# Patient Record
Sex: FEMALE | Race: WHITE | NOT HISPANIC OR LATINO | Employment: UNEMPLOYED | URBAN - NONMETROPOLITAN AREA
[De-identification: names, ages, dates, MRNs, and addresses within clinical notes are randomized per-mention and may not be internally consistent; named-entity substitution may affect disease eponyms.]

---

## 2020-08-07 ENCOUNTER — HOSPITAL ENCOUNTER (INPATIENT)
Facility: HOSPITAL | Age: 85
LOS: 7 days | Discharge: HOME | End: 2020-08-14
Attending: PSYCHIATRY & NEUROLOGY | Admitting: PSYCHIATRY & NEUROLOGY

## 2020-08-07 PROBLEM — F29 PSYCHOSIS (HCC): Status: ACTIVE | Noted: 2020-08-07

## 2020-08-07 LAB — MAGNESIUM SERPL-MCNC: 1.9 MG/DL (ref 1.6–2.4)

## 2020-08-07 PROCEDURE — 99223 1ST HOSP IP/OBS HIGH 75: CPT | Performed by: PSYCHIATRY & NEUROLOGY

## 2020-08-07 PROCEDURE — 25010000002 LORAZEPAM PER 2 MG: Performed by: PSYCHIATRY & NEUROLOGY

## 2020-08-07 PROCEDURE — 83735 ASSAY OF MAGNESIUM: CPT

## 2020-08-07 RX ORDER — FLUCONAZOLE 100 MG/1
100 TABLET ORAL DAILY
Status: DISPENSED | OUTPATIENT
Start: 2020-08-07 | End: 2020-08-12

## 2020-08-07 RX ORDER — ASPIRIN 81 MG/1
81 TABLET, CHEWABLE ORAL DAILY
Status: DISCONTINUED | OUTPATIENT
Start: 2020-08-07 | End: 2020-08-14 | Stop reason: HOSPADM

## 2020-08-07 RX ORDER — LOSARTAN POTASSIUM 50 MG/1
100 TABLET ORAL DAILY
Status: DISCONTINUED | OUTPATIENT
Start: 2020-08-07 | End: 2020-08-14 | Stop reason: HOSPADM

## 2020-08-07 RX ORDER — ASPIRIN 81 MG/1
81 TABLET, CHEWABLE ORAL DAILY
COMMUNITY

## 2020-08-07 RX ORDER — POTASSIUM CHLORIDE 20 MEQ/1
20 TABLET, EXTENDED RELEASE ORAL DAILY
Status: DISCONTINUED | OUTPATIENT
Start: 2020-08-07 | End: 2020-08-14 | Stop reason: HOSPADM

## 2020-08-07 RX ORDER — OLANZAPINE 5 MG/1
5 TABLET, ORALLY DISINTEGRATING ORAL ONCE
Status: COMPLETED | OUTPATIENT
Start: 2020-08-07 | End: 2020-08-07

## 2020-08-07 RX ORDER — ESOMEPRAZOLE MAGNESIUM 40 MG/1
40 CAPSULE, DELAYED RELEASE ORAL
COMMUNITY

## 2020-08-07 RX ORDER — PANTOPRAZOLE SODIUM 40 MG/1
40 TABLET, DELAYED RELEASE ORAL EVERY MORNING
Status: DISCONTINUED | OUTPATIENT
Start: 2020-08-07 | End: 2020-08-14 | Stop reason: HOSPADM

## 2020-08-07 RX ORDER — TRAZODONE HYDROCHLORIDE 50 MG/1
12.5 TABLET ORAL NIGHTLY PRN
Status: DISPENSED | OUTPATIENT
Start: 2020-08-07 | End: 2020-08-09

## 2020-08-07 RX ORDER — OLANZAPINE 5 MG/1
5 TABLET, ORALLY DISINTEGRATING ORAL DAILY
Status: DISCONTINUED | OUTPATIENT
Start: 2020-08-08 | End: 2020-08-08

## 2020-08-07 RX ORDER — ONDANSETRON 4 MG/1
4 TABLET, FILM COATED ORAL EVERY MORNING
Status: DISCONTINUED | OUTPATIENT
Start: 2020-08-07 | End: 2020-08-14 | Stop reason: HOSPADM

## 2020-08-07 RX ORDER — POTASSIUM CHLORIDE 20 MEQ/1
20 TABLET, EXTENDED RELEASE ORAL DAILY
COMMUNITY

## 2020-08-07 RX ORDER — LORAZEPAM 2 MG/ML
1 INJECTION INTRAMUSCULAR ONCE
Status: COMPLETED | OUTPATIENT
Start: 2020-08-07 | End: 2020-08-07

## 2020-08-07 RX ORDER — ONDANSETRON 4 MG/1
4 TABLET, FILM COATED ORAL EVERY MORNING
Status: CANCELLED | OUTPATIENT
Start: 2020-08-07

## 2020-08-07 RX ORDER — MULTIVITAMIN
1 TABLET ORAL DAILY
Status: CANCELLED | OUTPATIENT
Start: 2020-08-07

## 2020-08-07 RX ORDER — MULTIVITAMIN
1 TABLET ORAL DAILY
Status: DISCONTINUED | OUTPATIENT
Start: 2020-08-07 | End: 2020-08-14 | Stop reason: HOSPADM

## 2020-08-07 RX ORDER — LOSARTAN POTASSIUM 50 MG/1
100 TABLET ORAL DAILY
Status: CANCELLED | OUTPATIENT
Start: 2020-08-07

## 2020-08-07 RX ORDER — POTASSIUM CHLORIDE 20 MEQ/1
20 TABLET, EXTENDED RELEASE ORAL DAILY
Status: CANCELLED | OUTPATIENT
Start: 2020-08-07

## 2020-08-07 RX ORDER — CARVEDILOL 25 MG/1
25 TABLET ORAL 2 TIMES DAILY WITH MEALS
Status: DISCONTINUED | OUTPATIENT
Start: 2020-08-07 | End: 2020-08-14 | Stop reason: HOSPADM

## 2020-08-07 RX ORDER — BENZONATATE 100 MG/1
100 CAPSULE ORAL 3 TIMES DAILY PRN
Status: DISCONTINUED | OUTPATIENT
Start: 2020-08-07 | End: 2020-08-14 | Stop reason: HOSPADM

## 2020-08-07 RX ORDER — AMLODIPINE BESYLATE 5 MG/1
5 TABLET ORAL DAILY
Status: CANCELLED | OUTPATIENT
Start: 2020-08-07

## 2020-08-07 RX ORDER — POLYETHYLENE GLYCOL 3350 17 G/17G
17 POWDER, FOR SOLUTION ORAL DAILY
Status: CANCELLED | OUTPATIENT
Start: 2020-08-07

## 2020-08-07 RX ORDER — FLUCONAZOLE 50 MG/1
100 TABLET ORAL DAILY
COMMUNITY
Start: 2020-08-05 | End: 2020-08-14 | Stop reason: HOSPADM

## 2020-08-07 RX ORDER — ASPIRIN 81 MG/1
81 TABLET, CHEWABLE ORAL DAILY
Status: CANCELLED | OUTPATIENT
Start: 2020-08-07

## 2020-08-07 RX ORDER — POLYETHYLENE GLYCOL 3350 17 G/17G
17 POWDER, FOR SOLUTION ORAL DAILY
COMMUNITY

## 2020-08-07 RX ORDER — AMLODIPINE BESYLATE 5 MG/1
5 TABLET ORAL DAILY
COMMUNITY

## 2020-08-07 RX ORDER — MULTIVITAMIN
1 TABLET ORAL DAILY
COMMUNITY

## 2020-08-07 RX ORDER — IBUPROFEN 400 MG/1
400 TABLET ORAL EVERY 6 HOURS PRN
Status: DISCONTINUED | OUTPATIENT
Start: 2020-08-07 | End: 2020-08-14 | Stop reason: HOSPADM

## 2020-08-07 RX ORDER — ONDANSETRON 4 MG/1
4 TABLET, FILM COATED ORAL EVERY MORNING
COMMUNITY

## 2020-08-07 RX ORDER — CEPHALEXIN 500 MG/1
500 CAPSULE ORAL 2 TIMES DAILY
COMMUNITY
Start: 2020-08-05 | End: 2020-08-14 | Stop reason: HOSPADM

## 2020-08-07 RX ORDER — ECHINACEA PURPUREA EXTRACT 125 MG
2 TABLET ORAL AS NEEDED
Status: DISCONTINUED | OUTPATIENT
Start: 2020-08-07 | End: 2020-08-14 | Stop reason: HOSPADM

## 2020-08-07 RX ORDER — LOPERAMIDE HYDROCHLORIDE 2 MG/1
2 CAPSULE ORAL
Status: DISCONTINUED | OUTPATIENT
Start: 2020-08-07 | End: 2020-08-14 | Stop reason: HOSPADM

## 2020-08-07 RX ORDER — CARVEDILOL 25 MG/1
25 TABLET ORAL 2 TIMES DAILY WITH MEALS
Status: CANCELLED | OUTPATIENT
Start: 2020-08-07

## 2020-08-07 RX ORDER — CEPHALEXIN 500 MG/1
500 CAPSULE ORAL EVERY 12 HOURS SCHEDULED
Status: DISCONTINUED | OUTPATIENT
Start: 2020-08-07 | End: 2020-08-14 | Stop reason: HOSPADM

## 2020-08-07 RX ORDER — POLYETHYLENE GLYCOL 3350 17 G/17G
17 POWDER, FOR SOLUTION ORAL DAILY
Status: DISCONTINUED | OUTPATIENT
Start: 2020-08-07 | End: 2020-08-14 | Stop reason: HOSPADM

## 2020-08-07 RX ORDER — ACETAMINOPHEN 325 MG/1
650 TABLET ORAL EVERY 6 HOURS PRN
Status: DISCONTINUED | OUTPATIENT
Start: 2020-08-07 | End: 2020-08-14 | Stop reason: HOSPADM

## 2020-08-07 RX ORDER — PANTOPRAZOLE SODIUM 40 MG/1
40 TABLET, DELAYED RELEASE ORAL EVERY MORNING
Status: CANCELLED | OUTPATIENT
Start: 2020-08-07

## 2020-08-07 RX ORDER — ONDANSETRON 4 MG/1
4 TABLET, FILM COATED ORAL EVERY 6 HOURS PRN
Status: DISCONTINUED | OUTPATIENT
Start: 2020-08-07 | End: 2020-08-14 | Stop reason: HOSPADM

## 2020-08-07 RX ORDER — LANOLIN ALCOHOL/MO/W.PET/CERES
3 CREAM (GRAM) TOPICAL NIGHTLY PRN
Status: DISCONTINUED | OUTPATIENT
Start: 2020-08-09 | End: 2020-08-14 | Stop reason: HOSPADM

## 2020-08-07 RX ORDER — LOSARTAN POTASSIUM 100 MG/1
100 TABLET ORAL DAILY
COMMUNITY

## 2020-08-07 RX ORDER — AMLODIPINE BESYLATE 5 MG/1
5 TABLET ORAL DAILY
Status: DISCONTINUED | OUTPATIENT
Start: 2020-08-07 | End: 2020-08-14 | Stop reason: HOSPADM

## 2020-08-07 RX ORDER — ALUMINA, MAGNESIA, AND SIMETHICONE 2400; 2400; 240 MG/30ML; MG/30ML; MG/30ML
15 SUSPENSION ORAL EVERY 6 HOURS PRN
Status: DISCONTINUED | OUTPATIENT
Start: 2020-08-07 | End: 2020-08-14 | Stop reason: HOSPADM

## 2020-08-07 RX ORDER — CARVEDILOL 25 MG/1
25 TABLET ORAL 2 TIMES DAILY WITH MEALS
COMMUNITY

## 2020-08-07 RX ADMIN — TRAZODONE HYDROCHLORIDE 12.5 MG: 50 TABLET ORAL at 21:31

## 2020-08-07 RX ADMIN — OLANZAPINE 5 MG: 5 TABLET, ORALLY DISINTEGRATING ORAL at 17:34

## 2020-08-07 RX ADMIN — MAGNESIUM GLUCONATE 500 MG ORAL TABLET 400 MG: 500 TABLET ORAL at 21:31

## 2020-08-07 RX ADMIN — LORAZEPAM 1 MG: 2 INJECTION, SOLUTION INTRAMUSCULAR; INTRAVENOUS at 14:45

## 2020-08-07 RX ADMIN — CEPHALEXIN 500 MG: 500 CAPSULE ORAL at 21:31

## 2020-08-07 NOTE — PLAN OF CARE
Problem: Patient Care Overview  Goal: Plan of Care Review  Outcome: Ongoing (interventions implemented as appropriate)  Flowsheets (Taken 8/7/2020 1523)  Progress: no change  Plan of Care Reviewed With: patient  Patient Agreement with Plan of Care: unable to participate  Outcome Summary: Patient oriented to self only. Manic, hyperverbal, agitated and hostile majority of shift requiring frequent redirectionand PRN medications. Sitter at bedside due to falls and confusion with poor boundaries.

## 2020-08-07 NOTE — H&P
INITIAL PSYCHIATRIC HISTORY & PHYSICAL    Patient Identification:  Name:  Vandana Velez  Age:  85 y.o.  Sex:  female  :  1935  MRN:  [id number]   Visit Number:  [id number]  Primary Care Physician:  Vasu Gunn MD    SUBJECTIVE    CC/Focus of Exam: psychosis    HPI: Vandana Velez is a 85 y.o. female who was admitted on 2020 with complaints of bizarre behaviors. The patient was seen in her room where she is very irritable and unhappy and has been screaming on an ongoing basis. She is confused and is not able to provide a coherent history and tends to confabulate also, she believes she is in [place] and tried to shana the question about time saying it is present time, and she wants to see her own doctor because he knows everything about her and she believes he is around as he also works in [facility].   According to report provided by patient's son, up until July 10, 2020, the patient was completely independent and coherent in ADLs and IADLs. She was able to drive on her own, go to grocery store and also lived by herself. Son reported to nursing staff earlier today that the patient under went botox injection for her bladder on 7/10 and she has had these injections on an off for the last seven years, but since her last injection she has not done well. Her condition has declined and she has had spells of weakness with confusion and incoherence and could no longer taker care of herself. She was admitted twice to the hospital for low sodium since 7/10 and was discharged last time to NH for rehab. She became agitated at the NH and was admitted to the psych unit and came back with no improvement. She was treated with Seroquel but it didn't help.     PAST PSYCHIATRIC HX: None reported    SUBSTANCE USE HX: None reported    SOCIAL HX:   Social History     Socioeconomic History   • Marital status:      Spouse name: Not on file   • Number of children: Not on file   • Years of  education: Not on file   • Highest education level: Not on file   Tobacco Use   • Smoking status: Unknown If Ever Smoked   Substance and Sexual Activity   • Alcohol use: Not Currently   • Drug use: Never   • Sexual activity: Not Currently         Past Medical History:   Diagnosis Date   • Hypertension    • Overactive bladder           Past Surgical History:   Procedure Laterality Date   • CYSTOSCOPY BOTOX INJECTION OF BLADDER         Family History:  The patient is unable to provided a valid family history due to confusion.      Medications Prior to Admission   Medication Sig Dispense Refill Last Dose   • amLODIPine (NORVASC) 5 MG tablet Take 5 mg by mouth Daily.   8/6/2020 at Unknown time   • aspirin 81 MG chewable tablet Chew 81 mg Daily.   8/6/2020 at Unknown time   • carvedilol (COREG) 25 MG tablet Take 25 mg by mouth 2 (Two) Times a Day With Meals.   8/6/2020 at Unknown time   • cephalexin (KEFLEX) 500 MG capsule Take 500 mg by mouth 2 (Two) Times a Day.   8/6/2020 at Unknown time   • esomeprazole (nexIUM) 40 MG capsule Take 40 mg by mouth Every Morning Before Breakfast.   8/6/2020 at Unknown time   • fluconazole (DIFLUCAN) 50 MG tablet Take 100 mg by mouth Daily.   8/6/2020 at Unknown time   • losartan (COZAAR) 100 MG tablet Take 100 mg by mouth Daily.   8/6/2020 at Unknown time   • magnesium oxide (MAGOX) 400 (241.3 Mg) MG tablet tablet Take 400 mg by mouth 2 (two) times a day.   8/6/2020 at Unknown time   • multivitamin (DAILY ROSETTE) tablet tablet Take 1 tablet by mouth Daily.   8/6/2020 at Unknown time   • ondansetron (ZOFRAN) 4 MG tablet Take 4 mg by mouth Every Morning.   8/6/2020 at Unknown time   • polyethylene glycol (MIRALAX) packet Take 17 g by mouth Daily.   8/6/2020 at Unknown time   • potassium chloride (K-DUR,KLOR-CON) 20 MEQ CR tablet Take 20 mEq by mouth Daily.   8/6/2020 at Unknown time         ALLERGIES:  Patient has no known allergies.    Temp:  [96.9 °F (36.1 °C)-98.2 °F (36.8 °C)] 98.2 °F  (36.8 °C)  Heart Rate:  [56-82] 56  Resp:  [16-18] 16  BP: (124-139)/(60-83) 139/83    REVIEW OF SYSTEMS:  Review of Systems   The validity of ROS is questionable but she reports she is hurting all over.    OBJECTIVE    PHYSICAL EXAM:  Physical Exam  The patient was not cooperative.    MENTAL STATUS EXAM:    Hygiene:   poor  Cooperation:  Suspicious  Eye Contact:  Poor  Psychomotor Behavior:  Restless  Affect:  Labile  Hopelessness: Denies  Speech:  Rambling  Thought Progress:  Disorganized  Thought Content:  Bizarre  Suicidal:  None  Homicidal:  None  Hallucinations:  None  Delusion:  Paranoid  Memory:  Deficits  Orientation:  Person  Reliability:  poor  Insight:  Poor  Judgement:  Poor  Impulse Control:  Poor      Imaging Results (Last 24 Hours)     ** No results found for the last 24 hours. **           ECG/EMG Results (most recent)     None           Lab Results   Component Value Date    BUN 17 12/23/2018    CREATININE 0.88 12/23/2018    EGFRIFNONA >60 12/23/2018    EGFRIFAFRI >60 12/23/2018    BCR 19 12/23/2018    CO2 27 12/23/2018    CALCIUM 9.0 12/23/2018       Lab Results   Component Value Date    WBC 6.7 08/03/2020    HGB 13.1 08/03/2020    HCT 37.6 08/03/2020    MCV 92.9 08/03/2020     08/03/2020       Last Urine Toxicity     There is no flowsheet data to display.          Brief Urine Lab Results     None          DATA  Labs reviewed. WBC 6.5, Glucose 99, electrolytes faustina, BUN high at 39, Creatinine normal at 1.0, Sodium normal at 140, Potassium low at 3.4, GFR low at 52.9, Blood alcohol level 0.0, UDS negative.COV2 negativeof head shows cortical volume loss and scattered periventricular/subcortical small vessel ischemic disease. Intravascular calcifications are present.  EKG reviewed. LVH, Normal sinus rhythm, QTc 416  SARAVANAN pending.  Record reviewed from [facility] ED. The patient received Geodon IM due to her agitated and combative behaviors while in the ED in [place].    Strengths:  Good family support    Weaknesses:Poor insight and cognitive deficits    Code status:  Full  Was not able to discuss code status with patient.    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 She has positive findings of cortical volume loss and and subcortical small vessel disease on CT head. Will try to manage behavior disturbance with SGA and supportive treatment with behavioral interventions.  - Will need to inform family about probability of dementia, guardianship and disposition planning.       UTI  - Continue Keflex and Diflucan  - Could be contributing to confusion      HTN  - Continue Cozaar, Coreg and Norvasc       Hypokalemia  - Continue KCl CR 20 mEq       The patient has been admitted for safety and stabilization.  Patient will be monitored for suicidality daily and maintained on Special Precautions Level 1 (1:1 sitter).  The patient will have individual and group therapy with a master's level therapist. A master treatment plan will be developed and agreed upon by the patient and his/her treatment team.  The patient's estimated length of stay in the hospital is 5-7 days.

## 2020-08-07 NOTE — PROGRESS NOTES
"Nutrition Services    Patient Name:  Vandana Velez  YOB: 1935  MRN: [id number]  Admit Date:  8/7/2020    Discussed with RN per pt mental status staff unable to obtain weight at this time. Noted pt consuming 25% x 1 meal, and \"few bites\" at other meals.   Will continue to follow and make changes as appropriate.    Electronically signed by:  Mary Andujar  08/07/20 14:00   "

## 2020-08-07 NOTE — NURSING NOTE
Patient refused all medications, very aggressive and cursing at staff.  Dr. Talamantes in room with patient and patient is aggressive with him.

## 2020-08-07 NOTE — PLAN OF CARE
"  Problem: Patient Care Overview  Goal: Plan of Care Review  8/7/2020 1612 by Kailash Suarez LCSW  Outcome: Ongoing (interventions implemented as appropriate)  Flowsheets  Taken 8/7/2020 1604  Consent Given to Review Plan with: Oswald Velez (son)  Outcome Summary: Spoke with patient briefly at her bedside, introducing self as assigned therapist. Patient refused to speak with therapist and asked to see a \"Dr. Gunn.\"  Taken 8/7/2020 1611  Progress: no change  Plan of Care Reviewed With: patient  Patient Agreement with Plan of Care: agrees     Problem: Patient Care Overview  Goal: Individualization and Mutuality  8/7/2020 1612 by Kailash Suarez LCSW  Outcome: Ongoing (interventions implemented as appropriate)  Flowsheets (Taken 8/7/2020 1604)  Patient Personal Strengths: family/social support;community support  Patient Vulnerabilities: Irritability, disorientation, and confusion.  Patient Specific Goals (Include Timeframe): Stabilization  Patient Specific Interventions: Solution focus     Problem: Patient Care Overview  Goal: Discharge Needs Assessment  8/7/2020 1612 by Kailash Suarez LCSW  Outcome: Ongoing (interventions implemented as appropriate)  Flowsheets (Taken 8/7/2020 1604)  Outpatient/Agency/Support Group Needs: other (see comments) (nursing home)  Discharge Coordination/Progress: Nursing home  Transportation Anticipated: agency  Anticipated Discharge Disposition: inpatient rehab facility  Transportation Concerns: car, none  Current Discharge Risk: cognitively impaired;psychiatric illness  Concerns to be Addressed: mental health;cognitive/perceptual;coping/stress;discharge planning  Readmission Within the Last 30 Days: no previous admission in last 30 days  Patient/Family Anticipated Services at Transition: mental health services;rehabilitation services  Patient/Family Anticipates Transition to: long term care facility  Offered/Gave Vendor List: no     Problem: Patient Care " "Overview  Goal: Interprofessional Rounds/Family Conf  8/7/2020 1612 by Kailash Suarez LCSW  Outcome: Ongoing (interventions implemented as appropriate)  Flowsheets (Taken 8/7/2020 1604)  Participants: social work;nursing;milieu/psych techs  Summary: Discussed patient's case with nursing staff. Patient had been irritable and confused since arrival to the unit at 4:00 AM.       [id number]  D: Patient is an 85-year-old  female currently residing in [place], [place] where she has been living in a nursing home for the past week. She was admitted to a nursing home for rehabilitation following a UTI. She was admitted to a psychiatric unit for a few days because she suddenly began screaming. Upon returning from the psychiatric unit she continued to scream, demonstrating symptoms of disorientation and confusion. Son, Oswald, reported the patient was, until very recently, high functioning and independent. No history of psychiatric treatment. (This information was obtained from the [facility] referral).    When the therapist met with the patient she believed she knew the therapist from somewhere. She demanded to see a \"Dr. Gunn.\" She told the therapist he could go on his way, that she didn't need a therapist.     A: Patient's affect appeared irritable, and her mood elevated. Her speech seemed pressured to a degree. She appeared disoriented and confused, not being able to carry on a conversation, identify her location, or identify her reason for admission.     P: Patient has been placed on special precautions and is being constantly monitored for her safety. Therapist will assist in gathering collateral information from patient's family and coordinating with her son, Oswald, who is POA, for aftercare plans.   "

## 2020-08-07 NOTE — NURSING NOTE
Patient spitting on and attempting to hit staff. Redirection attempted without success. Dr. Talamantes notified. New orders received.

## 2020-08-07 NOTE — NURSING NOTE
Spoke with son Oswald regarding the condition of his mother. He reports that up til July 10th she was completely independent and coherent. Drove, went to the grocery store, and lived alone. On 7/10 she had to go to [place] for a botox injection to her bladder which she had been having on and off for 7 years. From that day forward her condition has declined. She has spells of weakness. She has been confused and no longer coherent enough to take care of herself. Her sodium has dropped twice since 7/10 and she has had two admissions to the hospital where she went to the nursing home for rehab to regain her strength.

## 2020-08-08 PROCEDURE — 25010000002 HALOPERIDOL LACTATE PER 5 MG: Performed by: PSYCHIATRY & NEUROLOGY

## 2020-08-08 PROCEDURE — 99233 SBSQ HOSP IP/OBS HIGH 50: CPT | Performed by: PSYCHIATRY & NEUROLOGY

## 2020-08-08 PROCEDURE — 93005 ELECTROCARDIOGRAM TRACING: CPT | Performed by: PSYCHIATRY & NEUROLOGY

## 2020-08-08 PROCEDURE — 63710000001 ONDANSETRON PER 8 MG: Performed by: PSYCHIATRY & NEUROLOGY

## 2020-08-08 RX ORDER — HALOPERIDOL 5 MG/ML
2 INJECTION INTRAMUSCULAR ONCE
Status: COMPLETED | OUTPATIENT
Start: 2020-08-08 | End: 2020-08-08

## 2020-08-08 RX ORDER — RISPERIDONE 1 MG/1
1 TABLET ORAL ONCE
Status: DISCONTINUED | OUTPATIENT
Start: 2020-08-08 | End: 2020-08-13

## 2020-08-08 RX ORDER — OLANZAPINE 5 MG/1
5 TABLET, ORALLY DISINTEGRATING ORAL 2 TIMES DAILY
Status: DISCONTINUED | OUTPATIENT
Start: 2020-08-08 | End: 2020-08-13

## 2020-08-08 RX ADMIN — Medication 1 TABLET: at 09:10

## 2020-08-08 RX ADMIN — HALOPERIDOL LACTATE 2 MG: 5 INJECTION INTRAMUSCULAR at 01:37

## 2020-08-08 RX ADMIN — LOSARTAN POTASSIUM 100 MG: 50 TABLET, FILM COATED ORAL at 09:11

## 2020-08-08 RX ADMIN — PANTOPRAZOLE SODIUM 40 MG: 40 TABLET, DELAYED RELEASE ORAL at 09:12

## 2020-08-08 RX ADMIN — ONDANSETRON HYDROCHLORIDE 4 MG: 4 TABLET, FILM COATED ORAL at 09:12

## 2020-08-08 RX ADMIN — AMLODIPINE BESYLATE 5 MG: 5 TABLET ORAL at 09:09

## 2020-08-08 RX ADMIN — POTASSIUM CHLORIDE 20 MEQ: 1500 TABLET, EXTENDED RELEASE ORAL at 09:10

## 2020-08-08 RX ADMIN — ASPIRIN 81 MG: 81 TABLET, CHEWABLE ORAL at 09:09

## 2020-08-08 RX ADMIN — OLANZAPINE 5 MG: 5 TABLET, ORALLY DISINTEGRATING ORAL at 21:25

## 2020-08-08 RX ADMIN — FLUCONAZOLE 100 MG: 100 TABLET ORAL at 09:09

## 2020-08-08 RX ADMIN — CARVEDILOL 25 MG: 25 TABLET, FILM COATED ORAL at 09:10

## 2020-08-08 RX ADMIN — POLYETHYLENE GLYCOL (3350) 17 G: 17 POWDER, FOR SOLUTION ORAL at 09:10

## 2020-08-08 RX ADMIN — CARVEDILOL 25 MG: 25 TABLET, FILM COATED ORAL at 17:37

## 2020-08-08 RX ADMIN — MAGNESIUM GLUCONATE 500 MG ORAL TABLET 400 MG: 500 TABLET ORAL at 09:10

## 2020-08-08 RX ADMIN — CEPHALEXIN 500 MG: 500 CAPSULE ORAL at 09:10

## 2020-08-08 RX ADMIN — OLANZAPINE 5 MG: 5 TABLET, ORALLY DISINTEGRATING ORAL at 09:10

## 2020-08-08 NOTE — PLAN OF CARE
Patient oriented to self only. Manic, hyperverbal, agitated and hostile majority of shift requiring frequent redirectionand PRN medications. Sitter at bedside due to falls and confusion with poor boundaries.

## 2020-08-08 NOTE — NURSING NOTE
Pt yelling and cursing and spitting in the floor. Pt making threats to harm others. Unable to redirect the patient.Dr. Barragan notified of the patients behavior. Order received for Risperdal 1 mg po once. Order read back and verified.

## 2020-08-08 NOTE — PLAN OF CARE
Patient has been calmer today but still having angry and loud episodes.  Sitter at bedside, gait unsteady, fall precautions, incont episodes and needs cues to drink/eat.  Problem: Patient Care Overview  Goal: Plan of Care Review  Outcome: Ongoing (interventions implemented as appropriate)  Flowsheets (Taken 8/8/2020 7799)  Progress: improving  Plan of Care Reviewed With: patient  Patient Agreement with Plan of Care: agrees  Goal: Individualization and Mutuality  Outcome: Ongoing (interventions implemented as appropriate)  Goal: Discharge Needs Assessment  Outcome: Ongoing (interventions implemented as appropriate)  Goal: Interprofessional Rounds/Family Conf  Outcome: Ongoing (interventions implemented as appropriate)

## 2020-08-08 NOTE — NURSING NOTE
Pt spit her medication across the room. Pt threatening staff, spitting at staff and cursing. Dr. Barragan notified. Order for Haldol 2mg Im x 1 dose stat. Order read back and verified.

## 2020-08-08 NOTE — PROGRESS NOTES
Subjective   Vandana Velez is a 85 y.o. female who presents today for hospital follow up    Chief Complaint:  Psychosis    History of Present Illness: Patient remains confused, agitated, and disorganized today.  On evaluation she is lying in bed and initially covers her head stating that she is dead.  I asked how we could be speaking if she was dead and uncovered her head, at that point in time patient became more comfortable with me and begin talking in a more reasonable and linear fashion though her thought content continued to be consistent with psychosis and anxiety.  She was able to answer some of my questions appropriately but has not slept and PRN medications including Risperdal and Haldol overnight were ineffective.  Will increase Zyprexa to twice a day dosing today to see if we can improve overall symptoms.    The following portions of the patient's history were reviewed and updated as appropriate: allergies, current medications, past family history, past medical history, past social history, past surgical history and problem list.      Past Medical History:  Past Medical History:   Diagnosis Date   • Hypertension    • Overactive bladder        Social History:  Social History     Socioeconomic History   • Marital status:      Spouse name: Not on file   • Number of children: Not on file   • Years of education: Not on file   • Highest education level: Not on file   Tobacco Use   • Smoking status: Unknown If Ever Smoked   Substance and Sexual Activity   • Alcohol use: Not Currently   • Drug use: Never   • Sexual activity: Not Currently       Family History:  No family history on file.    Past Surgical History:  Past Surgical History:   Procedure Laterality Date   • CYSTOSCOPY BOTOX INJECTION OF BLADDER         Problem List:  Patient Active Problem List   Diagnosis   • Psychosis (CMS/[facility])       Allergy:   No Known Allergies     Current Medications:   Current Facility-Administered Medications    Medication Dose Route Frequency Provider Last Rate Last Dose   • acetaminophen (TYLENOL) tablet 650 mg  650 mg Oral Q6H PRN Jesus Joe MD       • aluminum-magnesium hydroxide-simethicone (MAALOX MAX) 400-400-40 MG/5ML suspension 15 mL  15 mL Oral Q6H PRN Jesus Joe MD       • amLODIPine (NORVASC) tablet 5 mg  5 mg Oral Daily Pawan Talamantes MD   5 mg at 08/08/20 0909   • aspirin chewable tablet 81 mg  81 mg Oral Daily Pawan Talamantes MD   81 mg at 08/08/20 0909   • benzonatate (TESSALON) capsule 100 mg  100 mg Oral TID PRN Jesus Joe MD       • carvedilol (COREG) tablet 25 mg  25 mg Oral BID With Meals Pawan Talamantes MD   25 mg at 08/08/20 0910   • cephalexin (KEFLEX) capsule 500 mg  500 mg Oral Q12H Pawan Talamantes MD   500 mg at 08/08/20 0910   • fluconazole (DIFLUCAN) tablet 100 mg  100 mg Oral Daily Pawan Talamantes MD   100 mg at 08/08/20 0909   • ibuprofen (ADVIL,MOTRIN) tablet 400 mg  400 mg Oral Q6H PRN Jesus Joe MD       • loperamide (IMODIUM) capsule 2 mg  2 mg Oral Q2H PRN Jesus Joe MD       • losartan (COZAAR) tablet 100 mg  100 mg Oral Daily Pawan Talamantes MD   100 mg at 08/08/20 0911   • magnesium hydroxide (MILK OF MAGNESIA) suspension 2400 mg/10mL 10 mL  10 mL Oral Daily PRN Jesus Joe MD       • magnesium oxide (MAG-OX) tablet 400 mg  400 mg Oral BID Pawan Talamantes MD   400 mg at 08/08/20 0910   • [START ON 8/9/2020] melatonin tablet 3 mg  3 mg Oral Nightly PRN Jesus Joe MD       • multivitamin (DAILY ROSETTE) tablet 1 tablet  1 tablet Oral Daily Pawan Talamantes MD   1 tablet at 08/08/20 0910   • OLANZapine zydis (zyPREXA) disintegrating tablet 5 mg  5 mg Oral Daily Pawan Talamantes MD   5 mg at 08/08/20 0910   • ondansetron (ZOFRAN) tablet 4 mg  4 mg Oral Q6H PRN Jesus Joe MD       • ondansetron (ZOFRAN) tablet 4 mg  4 mg Oral QAPawan Morel MD   4 mg at 08/08/20 0912   • pantoprazole (PROTONIX) EC tablet 40 mg  40 mg Oral Pawan Garcia MD  "  40 mg at 08/08/20 0912   • polyethylene glycol (MIRALAX) packet 17 g  17 g Oral Daily Pawan Talamantes MD   17 g at 08/08/20 0910   • potassium chloride (K-DUR,KLOR-CON) CR tablet 20 mEq  20 mEq Oral Daily Pawan Talamantes MD   20 mEq at 08/08/20 0910   • risperiDONE (risperDAL) tablet 1 mg  1 mg Oral Once Armando Barragan MD       • sodium chloride nasal spray 2 spray  2 spray Each Nare PRN Jesus Joe MD       • traZODone (DESYREL) tablet 12.5 mg  12.5 mg Oral Nightly PRN Jesus Joe MD   12.5 mg at 08/07/20 2131       Review of Symptoms:    Review of Systems      Physical Exam:   Blood pressure 145/70, pulse 99, temperature 98.1 °F (36.7 °C), temperature source Temporal, resp. rate 20, height 157.5 cm (62\"), SpO2 97 %, not currently breastfeeding.    Appearance: Disheveled, distressed CF of stated age  Gait, Station, Strength: Reduced     Mental Status Exam:   Hygiene:   fair  Cooperation:  Cooperative  Eye Contact:  Fair  Psychomotor Behavior:  Aggitated  Affect:  Full range and labile  Mood: anxious and agitated   Hopelessness: 10  Speech:  Pressured and Rapid  Thought Process:  Disorganized but more linear as she got comfortable   Thought Content:  Bizarre and Mood congruent  Suicidal:  None, thinks she is dead already  Homicidal:  None  Hallucinations:  Not demonstrated today  Delusion:  Other thinks she is dead  Memory:  Deficits  Orientation:  not oriented  Reliability:  poor  Insight:  Poor  Judgement:  Impaired  Impulse Control:  Impaired  Physical/Medical Issues:  Yes see list       Lab Results:   Admission on 08/07/2020   Component Date Value Ref Range Status   • Magnesium 08/07/2020 1.9  1.6 - 2.4 mg/dL Final       Assessment/Plan       Psychosis (CMS/HCC)  - The patient appears to have unmasking of dementia secondary to delirium. She has positive findings of cortical volume loss and and subcortical small vessel disease on CT head. Will try to manage behavior disturbance with SGA and " supportive treatment with behavioral interventions.  - Will need to inform family about probability of dementia, guardianship and disposition planning.   - So far medications seem ineffective, Zyprexa was initiated and has not controlled symptoms, she received Risperdal last night with little effect, Haldol was then given. Patient remains psychotic and delirious today.   -Increase Zyprexa to BID dosing at 5mg.       UTI  - Continue Keflex and Diflucan  - Could be contributing to confusion       HTN  - Continue Cozaar, Coreg and Norvasc        Hypokalemia  - Continue KCl CR 20 mEq         The patient has been admitted for safety and stabilization.  Patient will be monitored for suicidality daily and maintained on Special Precautions Level 1 (1:1 sitter).  The patient will have individual and group therapy with a master's level therapist. A master treatment plan will be developed and agreed upon by the patient and his/her treatment team.  The patient's estimated length of stay in the hospital is 5-7 days.       This document has been electronically signed by Armando Barragan MD  August 8, 2020 17:37

## 2020-08-09 LAB
ALBUMIN SERPL-MCNC: 3.76 G/DL (ref 3.5–5.2)
ALBUMIN/GLOB SERPL: 1.3 G/DL
ALP SERPL-CCNC: 74 U/L (ref 39–117)
ALT SERPL W P-5'-P-CCNC: 15 U/L (ref 1–33)
ANION GAP SERPL CALCULATED.3IONS-SCNC: 17.1 MMOL/L (ref 5–15)
AST SERPL-CCNC: 18 U/L (ref 1–32)
BILIRUB SERPL-MCNC: 0.7 MG/DL (ref 0–1.2)
BILIRUB UR QL STRIP: NEGATIVE
BUN SERPL-MCNC: 27 MG/DL (ref 8–23)
BUN/CREAT SERPL: 31.8 (ref 7–25)
CALCIUM SPEC-SCNC: 9.9 MG/DL (ref 8.6–10.5)
CHLORIDE SERPL-SCNC: 100 MMOL/L (ref 98–107)
CLARITY UR: CLEAR
CO2 SERPL-SCNC: 22.9 MMOL/L (ref 22–29)
COLOR UR: YELLOW
CREAT SERPL-MCNC: 0.85 MG/DL (ref 0.57–1)
GFR SERPL CREATININE-BSD FRML MDRD: 64 ML/MIN/1.73
GLOBULIN UR ELPH-MCNC: 2.9 GM/DL
GLUCOSE SERPL-MCNC: 96 MG/DL (ref 65–99)
GLUCOSE UR STRIP-MCNC: NEGATIVE MG/DL
HGB UR QL STRIP.AUTO: NEGATIVE
KETONES UR QL STRIP: ABNORMAL
LEUKOCYTE ESTERASE UR QL STRIP.AUTO: NEGATIVE
NITRITE UR QL STRIP: NEGATIVE
PH UR STRIP.AUTO: 5.5 [PH] (ref 5–8)
POTASSIUM SERPL-SCNC: 3.5 MMOL/L (ref 3.5–5.2)
PROT SERPL-MCNC: 6.7 G/DL (ref 6–8.5)
PROT UR QL STRIP: NEGATIVE
SODIUM SERPL-SCNC: 140 MMOL/L (ref 136–145)
SP GR UR STRIP: 1.01 (ref 1–1.03)
UROBILINOGEN UR QL STRIP: ABNORMAL

## 2020-08-09 PROCEDURE — 81003 URINALYSIS AUTO W/O SCOPE: CPT | Performed by: PSYCHIATRY & NEUROLOGY

## 2020-08-09 PROCEDURE — 80053 COMPREHEN METABOLIC PANEL: CPT | Performed by: PSYCHIATRY & NEUROLOGY

## 2020-08-09 PROCEDURE — 63710000001 ONDANSETRON PER 8 MG: Performed by: PSYCHIATRY & NEUROLOGY

## 2020-08-09 PROCEDURE — 63710000001 DIPHENHYDRAMINE PER 50 MG: Performed by: PSYCHIATRY & NEUROLOGY

## 2020-08-09 PROCEDURE — 99233 SBSQ HOSP IP/OBS HIGH 50: CPT | Performed by: PSYCHIATRY & NEUROLOGY

## 2020-08-09 PROCEDURE — 25010000002 HALOPERIDOL LACTATE PER 5 MG: Performed by: PSYCHIATRY & NEUROLOGY

## 2020-08-09 RX ORDER — HALOPERIDOL 5 MG/ML
5 INJECTION INTRAMUSCULAR ONCE
Status: COMPLETED | OUTPATIENT
Start: 2020-08-09 | End: 2020-08-09

## 2020-08-09 RX ORDER — DIPHENHYDRAMINE HCL 25 MG
50 CAPSULE ORAL ONCE
Status: COMPLETED | OUTPATIENT
Start: 2020-08-09 | End: 2020-08-09

## 2020-08-09 RX ORDER — HALOPERIDOL 5 MG/1
5 TABLET ORAL ONCE
Status: COMPLETED | OUTPATIENT
Start: 2020-08-09 | End: 2020-08-09

## 2020-08-09 RX ORDER — LORAZEPAM 2 MG/1
2 TABLET ORAL ONCE
Status: COMPLETED | OUTPATIENT
Start: 2020-08-09 | End: 2020-08-09

## 2020-08-09 RX ADMIN — ASPIRIN 81 MG: 81 TABLET, CHEWABLE ORAL at 08:19

## 2020-08-09 RX ADMIN — HALOPERIDOL LACTATE 5 MG: 5 INJECTION, SOLUTION INTRAMUSCULAR at 01:54

## 2020-08-09 RX ADMIN — OLANZAPINE 5 MG: 5 TABLET, ORALLY DISINTEGRATING ORAL at 08:19

## 2020-08-09 RX ADMIN — HALOPERIDOL 5 MG: 5 TABLET ORAL at 14:22

## 2020-08-09 RX ADMIN — OLANZAPINE 5 MG: 5 TABLET, ORALLY DISINTEGRATING ORAL at 21:06

## 2020-08-09 RX ADMIN — FLUCONAZOLE 100 MG: 100 TABLET ORAL at 08:19

## 2020-08-09 RX ADMIN — LORAZEPAM 2 MG: 2 TABLET ORAL at 14:22

## 2020-08-09 RX ADMIN — TRAZODONE HYDROCHLORIDE 12.5 MG: 50 TABLET ORAL at 00:21

## 2020-08-09 RX ADMIN — MAGNESIUM GLUCONATE 500 MG ORAL TABLET 400 MG: 500 TABLET ORAL at 08:19

## 2020-08-09 RX ADMIN — Medication 1 TABLET: at 08:19

## 2020-08-09 RX ADMIN — CEPHALEXIN 500 MG: 500 CAPSULE ORAL at 08:18

## 2020-08-09 RX ADMIN — ONDANSETRON HYDROCHLORIDE 4 MG: 4 TABLET, FILM COATED ORAL at 06:17

## 2020-08-09 RX ADMIN — POTASSIUM CHLORIDE 20 MEQ: 1500 TABLET, EXTENDED RELEASE ORAL at 08:19

## 2020-08-09 RX ADMIN — DIPHENHYDRAMINE HYDROCHLORIDE 50 MG: 25 CAPSULE ORAL at 14:22

## 2020-08-09 RX ADMIN — CEPHALEXIN 500 MG: 500 CAPSULE ORAL at 21:06

## 2020-08-09 RX ADMIN — MAGNESIUM GLUCONATE 500 MG ORAL TABLET 400 MG: 500 TABLET ORAL at 21:06

## 2020-08-09 RX ADMIN — POLYETHYLENE GLYCOL (3350) 17 G: 17 POWDER, FOR SOLUTION ORAL at 08:18

## 2020-08-09 RX ADMIN — PANTOPRAZOLE SODIUM 40 MG: 40 TABLET, DELAYED RELEASE ORAL at 06:17

## 2020-08-09 NOTE — PROGRESS NOTES
Subjective   Vandana Velez is a 85 y.o. female who presents today for hospital follow up    Chief Complaint:  Psychosis    History of Present Illness: Patient continues to be confused, agitated, and disorganized with bizarre thought content.  Today she starts cursing and then states that she wants to go to hell because it is warmer.  Added thick blanket and continued the interview.  She then grabbed aggressively at my clothing to draw me near but then relaxed and apologized.  She has been yelling and screaming overnight.  She was given a one-time dose of Haldol 5 mg.  She did not respond to this and continued her behavioral disturbance.  She has not responded appropriately to any antipsychotic medication administered thus far.  During the day today, she got agitated and aggressive to her sitter and a one-time dose of Haldol 5 mg, Ativan 2 mg, and Benadryl 50 mg was given p.o. due to agitation.  We will consider adding Depakote to her regimen.    The following portions of the patient's history were reviewed and updated as appropriate: allergies, current medications, past family history, past medical history, past social history, past surgical history and problem list.      Past Medical History:  Past Medical History:   Diagnosis Date   • Hypertension    • Overactive bladder        Social History:  Social History     Socioeconomic History   • Marital status:      Spouse name: Not on file   • Number of children: Not on file   • Years of education: Not on file   • Highest education level: Not on file   Tobacco Use   • Smoking status: Unknown If Ever Smoked   Substance and Sexual Activity   • Alcohol use: Not Currently   • Drug use: Never   • Sexual activity: Not Currently       Family History:  No family history on file.    Past Surgical History:  Past Surgical History:   Procedure Laterality Date   • CYSTOSCOPY BOTOX INJECTION OF BLADDER         Problem List:  Patient Active Problem List   Diagnosis   •  Psychosis (CMS/HCC)       Allergy:   No Known Allergies     Current Medications:   Current Facility-Administered Medications   Medication Dose Route Frequency Provider Last Rate Last Dose   • acetaminophen (TYLENOL) tablet 650 mg  650 mg Oral Q6H PRN Jesus Joe MD       • aluminum-magnesium hydroxide-simethicone (MAALOX MAX) 400-400-40 MG/5ML suspension 15 mL  15 mL Oral Q6H PRN Jesus Joe MD       • amLODIPine (NORVASC) tablet 5 mg  5 mg Oral Daily Pawan Talamantes MD   5 mg at 08/08/20 0909   • aspirin chewable tablet 81 mg  81 mg Oral Daily Pawan Talamantes MD   81 mg at 08/09/20 0819   • benzonatate (TESSALON) capsule 100 mg  100 mg Oral TID PRN Jesus Joe MD       • carvedilol (COREG) tablet 25 mg  25 mg Oral BID With Meals Pawan Talamantes MD   25 mg at 08/08/20 1737   • cephalexin (KEFLEX) capsule 500 mg  500 mg Oral Q12H Pawan Talamantes MD   500 mg at 08/09/20 0818   • fluconazole (DIFLUCAN) tablet 100 mg  100 mg Oral Daily Pawan Talamantes MD   100 mg at 08/09/20 0819   • ibuprofen (ADVIL,MOTRIN) tablet 400 mg  400 mg Oral Q6H PRN Jesus Joe MD       • loperamide (IMODIUM) capsule 2 mg  2 mg Oral Q2H PRN eJsus Joe MD       • losartan (COZAAR) tablet 100 mg  100 mg Oral Daily Pawan Talamantes MD   100 mg at 08/08/20 0911   • magnesium hydroxide (MILK OF MAGNESIA) suspension 2400 mg/10mL 10 mL  10 mL Oral Daily PRN Jesus Joe MD       • magnesium oxide (MAG-OX) tablet 400 mg  400 mg Oral BID Pawan Talamantes MD   400 mg at 08/09/20 0819   • melatonin tablet 3 mg  3 mg Oral Nightly PRN Jesus Joe MD       • multivitamin (DAILY ROSETTE) tablet 1 tablet  1 tablet Oral Daily Pawan Talamantes MD   1 tablet at 08/09/20 0819   • OLANZapine zydis (zyPREXA) disintegrating tablet 5 mg  5 mg Oral BID Armando Barragan MD   5 mg at 08/09/20 0819   • ondansetron (ZOFRAN) tablet 4 mg  4 mg Oral Q6H PRN Jesus Joe MD       • ondansetron (ZOFRAN) tablet 4 mg  4 mg Oral JENNIFER Talamantes,  "MD Pawan   4 mg at 08/09/20 0617   • pantoprazole (PROTONIX) EC tablet 40 mg  40 mg Oral QAM Pawan Talamantes MD   40 mg at 08/09/20 0617   • polyethylene glycol (MIRALAX) packet 17 g  17 g Oral Daily Pawan Talamantes MD   17 g at 08/09/20 0818   • potassium chloride (K-DUR,KLOR-CON) CR tablet 20 mEq  20 mEq Oral Daily Pawan Talamantes MD   20 mEq at 08/09/20 0819   • risperiDONE (risperDAL) tablet 1 mg  1 mg Oral Once Armando Barragan MD       • sodium chloride nasal spray 2 spray  2 spray Each Nare Jesus Summers MD           Review of Symptoms:    Review of Systems   Musculoskeletal: Positive for back pain.   Psychiatric/Behavioral: Positive for agitation, behavioral problems, dysphoric mood and sleep disturbance.   Cannot obtain further review due to mental status       Physical Exam:   Blood pressure 111/58, pulse 74, temperature 96.9 °F (36.1 °C), temperature source Temporal, resp. rate 20, height 157.5 cm (62\"), SpO2 97 %, not currently breastfeeding.    Appearance: Disheveled, distressed CF of stated age  Gait, Station, Strength: Reduced     Mental Status Exam:   Hygiene:   fair  Cooperation:  Cooperative  Eye Contact:  Fair  Psychomotor Behavior:  Aggitated  Affect:  Full range and labile  Mood: anxious and agitated   Hopelessness: 10  Speech:  Pressured and Rapid  Thought Process:  Disorganized  Thought Content:  Bizarre and Mood congruent  Suicidal:  None  Homicidal:  None  Hallucinations:  Not demonstrated today  Delusion:  Other random, changing, bizarre delusions  Memory:  Deficits  Orientation:  not oriented  Reliability:  poor  Insight:  Poor  Judgement:  Impaired  Impulse Control:  Impaired  Physical/Medical Issues:  Yes see list       Lab Results:   Admission on 08/07/2020   Component Date Value Ref Range Status   • Magnesium 08/07/2020 1.9  1.6 - 2.4 mg/dL Final   • Glucose 08/09/2020 96  65 - 99 mg/dL Final   • BUN 08/09/2020 27* 8 - 23 mg/dL Final   • Creatinine 08/09/2020 0.85  0.57 - 1.00 " mg/dL Final   • Sodium 08/09/2020 140  136 - 145 mmol/L Final   • Potassium 08/09/2020 3.5  3.5 - 5.2 mmol/L Final   • Chloride 08/09/2020 100  98 - 107 mmol/L Final   • CO2 08/09/2020 22.9  22.0 - 29.0 mmol/L Final   • Calcium 08/09/2020 9.9  8.6 - 10.5 mg/dL Final   • Total Protein 08/09/2020 6.7  6.0 - 8.5 g/dL Final   • Albumin 08/09/2020 3.76  3.50 - 5.20 g/dL Final   • ALT (SGPT) 08/09/2020 15  1 - 33 U/L Final   • AST (SGOT) 08/09/2020 18  1 - 32 U/L Final   • Alkaline Phosphatase 08/09/2020 74  39 - 117 U/L Final   • Total Bilirubin 08/09/2020 0.7  0.0 - 1.2 mg/dL Final   • eGFR Non African Amer 08/09/2020 64  >60 mL/min/1.73 Final   • Globulin 08/09/2020 2.9  gm/dL Final   • A/G Ratio 08/09/2020 1.3  g/dL Final   • BUN/Creatinine Ratio 08/09/2020 31.8* 7.0 - 25.0 Final   • Anion Gap 08/09/2020 17.1* 5.0 - 15.0 mmol/L Final       Assessment/Plan       Psychosis (CMS/HCC)  - The patient appears to have unmasking of dementia secondary to delirium. She has positive findings of cortical volume loss and and subcortical small vessel disease on CT head. Will try to manage behavior disturbance with SGA and supportive treatment with behavioral interventions.  - Will need to inform family about probability of dementia, guardianship and disposition planning.   - So far medications seem ineffective, Zyprexa was initiated and has not controlled symptoms even with dose increase, she has received PRN Risperdal and Haldol with little effect.  -Increased Zyprexa to BID dosing at 5mg yesterday.  -Required Haldol 5 mg, Ativan 2 mg, and Benadryl 50 mg today for agitation and aggression  -Consider adding depakote to regimen due to poor treatment response with antipsychotics       UTI  - Continue Keflex and Diflucan  - BUN and anion GAP mildly elevated   - Could be contributing to confusion       HTN  - Continue Cozaar, Coreg and Norvasc        Hypokalemia  - Continue KCl CR 20 mEq         The patient has been admitted for safety  and stabilization.  Patient will be monitored for suicidality daily and maintained on Special Precautions Level 1 (1:1 sitter).  The patient will have individual and group therapy with a master's level therapist. A master treatment plan will be developed and agreed upon by the patient and his/her treatment team.  The patient's estimated length of stay in the hospital is 5-7 days.       This document has been electronically signed by Armando Barragan MD  August 9, 2020 15:08

## 2020-08-09 NOTE — NURSING NOTE
Patient resting with eye closed at this time. Sitter at bedside for safety. Will continue monitor.

## 2020-08-09 NOTE — NURSING NOTE
Pt has been up all night, frequent outburst, screaming, pt has not slept all night; one time dose of Haldol 5mg IM had little to no affect on pt; will continue to monitor.

## 2020-08-09 NOTE — NURSING NOTE
Called Dr MITCHEL Barragan r/t pt yelling, inappropriate sexual talk, pt threw glass of water on bedside sitter, pt increasingly agitated; becoming more difficult to redirect; new orders noted; Haldol 5mg IM; medications reviewed.

## 2020-08-09 NOTE — PLAN OF CARE
Problem: Patient Care Overview  Goal: Plan of Care Review  Outcome: Ongoing (interventions implemented as appropriate)  Flowsheets (Taken 8/9/2020 3387)  Progress: no change  Plan of Care Reviewed With: patient  Patient Agreement with Plan of Care: agrees  Note:   Pt confused; rambling; yelling; frequent outburst; pt hallucinating seeing people in her room; calling out for her Doctor; repeating phrases, “666” or “66” dollars, at times talking sexually inappropriate or Sikhism; pt redirected multiple times; fall precautions in place; sitter at bedside; refused bedtime medications; was able to get her to take Zyprexa; pt ate half ham sandwich for snack;  pt become increasingly agitated throughout the night, threw a glass of water on bedside sitter; new order noted per Dr MITCHEL Barragan; pt did not sleep any throughout the night;  will continue to monitor.

## 2020-08-09 NOTE — NURSING NOTE
Patient becoming increasingly more agitated. Spitting and hitting at staff. Unable to redirect. Dr. Barragan notified. New orders noted.

## 2020-08-09 NOTE — PLAN OF CARE
Problem: Patient Care Overview  Goal: Plan of Care Review  Outcome: Ongoing (interventions implemented as appropriate)  Flowsheets (Taken 8/9/2020 7075)  Progress: no change  Plan of Care Reviewed With: patient  Patient Agreement with Plan of Care: unable to participate  Outcome Summary: Patient continues to be confused and oriented to self only. Aggressive toward staff and at times spitting and attempting to hit. Patient went to sleep after PRN medications given.  Goal: Individualization and Mutuality  Outcome: Ongoing (interventions implemented as appropriate)  Goal: Discharge Needs Assessment  Outcome: Ongoing (interventions implemented as appropriate)  Goal: Interprofessional Rounds/Family Conf  Outcome: Ongoing (interventions implemented as appropriate)     Problem: Overarching Goals (Adult)  Goal: Adheres to Safety Considerations for Self and Others  Outcome: Ongoing (interventions implemented as appropriate)  Goal: Optimized Coping Skills in Response to Life Stressors  Outcome: Ongoing (interventions implemented as appropriate)

## 2020-08-10 PROCEDURE — 99232 SBSQ HOSP IP/OBS MODERATE 35: CPT | Performed by: PSYCHIATRY & NEUROLOGY

## 2020-08-10 PROCEDURE — 63710000001 ONDANSETRON PER 8 MG: Performed by: PSYCHIATRY & NEUROLOGY

## 2020-08-10 RX ADMIN — MAGNESIUM GLUCONATE 500 MG ORAL TABLET 400 MG: 500 TABLET ORAL at 08:40

## 2020-08-10 RX ADMIN — FLUCONAZOLE 100 MG: 100 TABLET ORAL at 08:39

## 2020-08-10 RX ADMIN — CEPHALEXIN 500 MG: 500 CAPSULE ORAL at 20:55

## 2020-08-10 RX ADMIN — POLYETHYLENE GLYCOL (3350) 17 G: 17 POWDER, FOR SOLUTION ORAL at 08:39

## 2020-08-10 RX ADMIN — ONDANSETRON HYDROCHLORIDE 4 MG: 4 TABLET, FILM COATED ORAL at 08:39

## 2020-08-10 RX ADMIN — OLANZAPINE 5 MG: 5 TABLET, ORALLY DISINTEGRATING ORAL at 08:40

## 2020-08-10 RX ADMIN — OLANZAPINE 5 MG: 5 TABLET, ORALLY DISINTEGRATING ORAL at 20:55

## 2020-08-10 RX ADMIN — CEPHALEXIN 500 MG: 500 CAPSULE ORAL at 08:39

## 2020-08-10 RX ADMIN — PANTOPRAZOLE SODIUM 40 MG: 40 TABLET, DELAYED RELEASE ORAL at 08:39

## 2020-08-10 RX ADMIN — MAGNESIUM GLUCONATE 500 MG ORAL TABLET 400 MG: 500 TABLET ORAL at 20:55

## 2020-08-10 RX ADMIN — ASPIRIN 81 MG: 81 TABLET, CHEWABLE ORAL at 08:40

## 2020-08-10 RX ADMIN — POTASSIUM CHLORIDE 20 MEQ: 1500 TABLET, EXTENDED RELEASE ORAL at 08:40

## 2020-08-10 RX ADMIN — Medication 1 TABLET: at 08:40

## 2020-08-10 NOTE — PLAN OF CARE
Problem: Patient Care Overview  Goal: Plan of Care Review  Outcome: Ongoing (interventions implemented as appropriate)  Flowsheets (Taken 8/10/2020 5697)  Progress: improving  Plan of Care Reviewed With: patient  Patient Agreement with Plan of Care: unable to participate  Note:   Patient has been alert with confusion this shift but able to answer some questions appropriately. Patient agitates easily without warning at times and attempts to spit water  on staff or scream out for no reason. Patient slept fairly well last night and ate only a few bites of meals today. Water intake is good. Staff assists patient to bathroom and helps with incontinent care as needed. Patient continues to be a high falls risk due to unsteady gait. No acute distress noted, will continue to monitor.

## 2020-08-10 NOTE — NURSING NOTE
Patient remains confused and oriented to self only. Patient has kept eyes closed while talking to staff this shift, which is a new behavior for her. While awake, patient is irritable, angry, and verbally threatening to spit on staff. Patient has excessively drank water this shift and has refused to eat.

## 2020-08-10 NOTE — PROGRESS NOTES
1600  Data:  Met with patient at bedside for individual. Introduced self as assigned therapist she was agreeable.Patient was yelling at RN staff and being non cooperative with staff getting her up to the bathroom. She has been confused and unable to provide reliable information. Reviewed patients chart information and discussed her reason for admission with RN staff.   Will discuss with Dr. Talamantes recommendations.     Assessment:  Patient appears confused and combative at times and unable to provide reliable information. She does not rate anxiety or depression. She requires assistance with ADL's.      Plan:  Continue stabilization, individual and group therapy to focus on healthy coping, safe discharge planning and follow up care.

## 2020-08-10 NOTE — PLAN OF CARE
Problem: Patient Care Overview  Goal: Plan of Care Review  Outcome: Ongoing (interventions implemented as appropriate)  Flowsheets (Taken 8/10/2020 8061)  Progress: no change  Plan of Care Reviewed With: patient  Patient Agreement with Plan of Care: unable to participate  Note:   Patient continues to be confused and oriented to self only. Patient unable to participate with assessment questions due to cognitive status. Patient slept well throughout the night.

## 2020-08-10 NOTE — PROGRESS NOTES
INPATIENT PSYCHIATRIC PROGRESS NOTE    Name:  Vandana Velez  :  1935  MRN:  [id number]  Visit Number:  [id number]  Length of stay:  3    SUBJECTIVE  CC/Focus of Exam: psychosis    INTERVAL HISTORY:  The patient continues to be confused. She has needed prn medications over the weekend due to continued agitated and disorganized behaviors. She is not very cooperative with the treatment team and has minimal insight at this time. She received prn Haldol, Ativan and Benadryl yesterday and this am has been more calm and has rested.    Review of Systems  Patient is sedated at this time.  OBJECTIVE    Temp:  [97.4 °F (36.3 °C)-97.6 °F (36.4 °C)] 97.4 °F (36.3 °C)  Heart Rate:  [83-90] 83  Resp:  [18-20] 18  BP: (116-152)/(67-73) 116/67    MENTAL STATUS EXAM:  Appearance:Casually dressed, good hygeine.   Psychomotor: No psychomotor agitation/retardation, No EPS, No motor tics  Thought Content- delusional material present  Thought process-disorganized.  Suicidality: No SI  Homicidality: No HI  Perception: No AH/VH  Insight-poor  Judgement-poor    Lab Results (last 24 hours)     Procedure Component Value Units Date/Time    Urinalysis With Culture If Indicated - Urine, Clean Catch [[id number]]  (Abnormal) Collected:  20 1455    Specimen:  Urine, Clean Catch Updated:  20 1619     Color, UA Yellow     Appearance, UA Clear     pH, UA 5.5     Specific Gravity, UA 1.009     Glucose, UA Negative     Ketones, UA 15 mg/dL (1+)     Bilirubin, UA Negative     Blood, UA Negative     Protein, UA Negative     Leuk Esterase, UA Negative     Nitrite, UA Negative     Urobilinogen, UA 0.2 E.U./dL    Narrative:       Urine microscopic not indicated.             Imaging Results (Last 24 Hours)     ** No results found for the last 24 hours. **             ECG/EMG Results (most recent)     Procedure Component Value Units Date/Time    ECG 12 Lead [[id number]] Collected:  20 1034     Updated:  20 1555    Narrative:        Test Reason : to obtain baseline  Blood Pressure : **/** mmHG  Vent. Rate : 073 BPM     Atrial Rate : 073 BPM     P-R Int : 158 ms          QRS Dur : 096 ms      QT Int : 420 ms       P-R-T Axes : 063 000 041 degrees     QTc Int : 462 ms    Normal sinus rhythm  Normal ECG  No previous ECGs available  Confirmed by Neo Ha ([id number]) on 8/8/2020 3:55:19 PM    Referred By:  CRISTOFER           Confirmed By:Neo Ha           ALLERGIES: Patient has no known allergies.      Current Facility-Administered Medications:   •  acetaminophen (TYLENOL) tablet 650 mg, 650 mg, Oral, Q6H PRN, Jesus Joe MD  •  aluminum-magnesium hydroxide-simethicone (MAALOX MAX) 400-400-40 MG/5ML suspension 15 mL, 15 mL, Oral, Q6H PRN, Jesus Joe MD  •  amLODIPine (NORVASC) tablet 5 mg, 5 mg, Oral, Daily, Pawan Talamantes MD, 5 mg at 08/08/20 0909  •  aspirin chewable tablet 81 mg, 81 mg, Oral, Daily, Pawan Talamantes MD, 81 mg at 08/10/20 0840  •  benzonatate (TESSALON) capsule 100 mg, 100 mg, Oral, TID PRN, Jesus Joe MD  •  carvedilol (COREG) tablet 25 mg, 25 mg, Oral, BID With Meals, Pawan Talamantes MD, 25 mg at 08/08/20 1737  •  cephalexin (KEFLEX) capsule 500 mg, 500 mg, Oral, Q12H, Pawan Talamantes MD, 500 mg at 08/10/20 0839  •  fluconazole (DIFLUCAN) tablet 100 mg, 100 mg, Oral, Daily, Pawan Talamantes MD, 100 mg at 08/10/20 0839  •  ibuprofen (ADVIL,MOTRIN) tablet 400 mg, 400 mg, Oral, Q6H PRN, Jesus Joe MD  •  loperamide (IMODIUM) capsule 2 mg, 2 mg, Oral, Q2H PRN, Jesus Joe MD  •  losartan (COZAAR) tablet 100 mg, 100 mg, Oral, Daily, Pawan Talamantes MD, 100 mg at 08/08/20 0911  •  magnesium hydroxide (MILK OF MAGNESIA) suspension 2400 mg/10mL 10 mL, 10 mL, Oral, Daily PRN, Jesus Joe MD  •  magnesium oxide (MAG-OX) tablet 400 mg, 400 mg, Oral, BID, Pawan Talamantes MD, 400 mg at 08/10/20 0840  •  melatonin tablet 3 mg, 3 mg, Oral, Nightly PRN, Jesus Joe MD  •  multivitamin (DAILY  ROSETTE) tablet 1 tablet, 1 tablet, Oral, Daily, Pawan Talamantes MD, 1 tablet at 08/10/20 0840  •  OLANZapine zydis (zyPREXA) disintegrating tablet 5 mg, 5 mg, Oral, BID, Armando Barragan MD, 5 mg at 08/10/20 0840  •  ondansetron (ZOFRAN) tablet 4 mg, 4 mg, Oral, Q6H PRN, Jesus Joe MD  •  ondansetron (ZOFRAN) tablet 4 mg, 4 mg, Oral, QAM, Pawan Talamantes MD, 4 mg at 08/10/20 0839  •  pantoprazole (PROTONIX) EC tablet 40 mg, 40 mg, Oral, Vinita RODRIGUEZ Mazhar, MD, 40 mg at 08/10/20 0839  •  polyethylene glycol (MIRALAX) packet 17 g, 17 g, Oral, Daily, Pawan Talamantes MD, 17 g at 08/10/20 0839  •  potassium chloride (K-DUR,KLOR-CON) CR tablet 20 mEq, 20 mEq, Oral, Daily, Pawan Talamantes MD, 20 mEq at 08/10/20 0840  •  risperiDONE (risperDAL) tablet 1 mg, 1 mg, Oral, Once, Armando Barragan MD  •  sodium chloride nasal spray 2 spray, 2 spray, Each Nare, PRN, Jesus Joe MD    ASSESSMENT & PLAN:      Psychosis (CMS/HCC)  - The patient appears to have unmasking of dementia secondary to delirium. She has positive findings of cortical volume loss and and subcortical small vessel disease on CT head. Will try to manage behavior disturbance with SGA and supportive treatment with behavioral interventions.  - Will need to inform family about probability of dementia, guardianship and disposition planning.   - Continue to treat symptomatically at this time.       UTI  - Continue Keflex and Diflucan  - Could be contributing to confusion       HTN  - Continue Cozaar, Coreg and Norvasc        Hypokalemia  - Continue KCl CR 20 mEq     Special precautions: Special Precautions Level 1 (1:1 sitter).    Behavioral Health Treatment Plan and Problem List: I have reviewed and approved the Behavioral Health Treatment Plan and Problem list.  The patient has had a chance to review and agrees with the treatment plan.     Clinician:  Pawan Talamantes MD  08/10/20  13:36

## 2020-08-11 PROCEDURE — 99232 SBSQ HOSP IP/OBS MODERATE 35: CPT | Performed by: PSYCHIATRY & NEUROLOGY

## 2020-08-11 PROCEDURE — 63710000001 ONDANSETRON PER 8 MG: Performed by: PSYCHIATRY & NEUROLOGY

## 2020-08-11 RX ADMIN — CEPHALEXIN 500 MG: 500 CAPSULE ORAL at 08:16

## 2020-08-11 RX ADMIN — FLUCONAZOLE 100 MG: 100 TABLET ORAL at 08:16

## 2020-08-11 RX ADMIN — POTASSIUM CHLORIDE 20 MEQ: 1500 TABLET, EXTENDED RELEASE ORAL at 08:16

## 2020-08-11 RX ADMIN — Medication 1 TABLET: at 08:16

## 2020-08-11 RX ADMIN — CARVEDILOL 25 MG: 25 TABLET, FILM COATED ORAL at 08:16

## 2020-08-11 RX ADMIN — AMLODIPINE BESYLATE 5 MG: 5 TABLET ORAL at 08:16

## 2020-08-11 RX ADMIN — Medication 3 MG: at 23:28

## 2020-08-11 RX ADMIN — MAGNESIUM GLUCONATE 500 MG ORAL TABLET 400 MG: 500 TABLET ORAL at 08:15

## 2020-08-11 RX ADMIN — ONDANSETRON HYDROCHLORIDE 4 MG: 4 TABLET, FILM COATED ORAL at 08:15

## 2020-08-11 RX ADMIN — OLANZAPINE 5 MG: 5 TABLET, ORALLY DISINTEGRATING ORAL at 08:15

## 2020-08-11 RX ADMIN — ASPIRIN 81 MG: 81 TABLET, CHEWABLE ORAL at 08:16

## 2020-08-11 RX ADMIN — OLANZAPINE 5 MG: 5 TABLET, ORALLY DISINTEGRATING ORAL at 20:18

## 2020-08-11 RX ADMIN — MAGNESIUM GLUCONATE 500 MG ORAL TABLET 400 MG: 500 TABLET ORAL at 20:18

## 2020-08-11 RX ADMIN — CEPHALEXIN 500 MG: 500 CAPSULE ORAL at 20:18

## 2020-08-11 RX ADMIN — PANTOPRAZOLE SODIUM 40 MG: 40 TABLET, DELAYED RELEASE ORAL at 08:15

## 2020-08-11 RX ADMIN — POLYETHYLENE GLYCOL (3350) 17 G: 17 POWDER, FOR SOLUTION ORAL at 08:17

## 2020-08-11 RX ADMIN — CARVEDILOL 25 MG: 25 TABLET, FILM COATED ORAL at 17:07

## 2020-08-11 RX ADMIN — LOSARTAN POTASSIUM 100 MG: 50 TABLET, FILM COATED ORAL at 08:16

## 2020-08-11 NOTE — PLAN OF CARE
Problem: Patient Care Overview  Goal: Interprofessional Rounds/Family Conf  Outcome: Ongoing (interventions implemented as appropriate)  Flowsheets (Taken 8/11/2020 3877)  Participants: psychiatrist; nursing  Summary: Discussed patients progres with treatment as well as disposition plans with Dr. Talamantes. He requested contact with patients son to discuss disposition plans.       Data:  Contacted patients son Oswald Velez to discuss patients progress with treatment as well as disposition plans. Mr Agustin talked about his her history of recent events that led up to her admission. He reports the patient had lived alone and was very independent until July 10 and after she appeared to have complications from having botox injections in her bladder. She was in the hospital in [facility] and admitted to us from there. He says she has been in and out of hospitals and the nursing home for rehab only and he hates have to put her back in the nursing home because she was only there for rehab and they stated she was uncooperative with staff and medicare and her insurance would not pay.   The patients son and his brother want to try to consider taking her home and hope for her to be stabilized as much as possible so she can be cared for her at home possible with Home Health in place and to also try to hire people to help them. He feels like this might possibly  take them a few more days to arrange her care at home. They have agreed if they take her home and cant take care of her they will seek a nursing home placement. Patient was transferred to ADOLPH Bejarano to discuss her medications.

## 2020-08-11 NOTE — PLAN OF CARE
Problem: Patient Care Overview  Goal: Plan of Care Review  Outcome: Ongoing (interventions implemented as appropriate)  Flowsheets (Taken 8/11/2020 1605)  Progress: no change  Plan of Care Reviewed With: patient  Patient Agreement with Plan of Care: unable to participate  Outcome Summary: Patient continues to be confused and is oriented to self only. Unable to participate in assessment questions. Aggressive towards staff at times and attempting to spit on staff. Able to redirect more today.  Goal: Individualization and Mutuality  Outcome: Ongoing (interventions implemented as appropriate)  Goal: Discharge Needs Assessment  Outcome: Ongoing (interventions implemented as appropriate)  Goal: Interprofessional Rounds/Family Conf  Outcome: Ongoing (interventions implemented as appropriate)     Problem: Overarching Goals (Adult)  Goal: Adheres to Safety Considerations for Self and Others  Outcome: Ongoing (interventions implemented as appropriate)  Goal: Optimized Coping Skills in Response to Life Stressors  Outcome: Ongoing (interventions implemented as appropriate)     Problem: Urinary Tract Infection (Adult)  Goal: Signs and Symptoms of Listed Potential Problems Will be Absent, Minimized or Managed (Urinary Tract Infection)  Outcome: Ongoing (interventions implemented as appropriate)

## 2020-08-11 NOTE — PLAN OF CARE
Problem: Patient Care Overview  Goal: Plan of Care Review  Outcome: Ongoing (interventions implemented as appropriate)  Flowsheets (Taken 8/11/2020 4231)  Progress: improving  Plan of Care Reviewed With: patient  Patient Agreement with Plan of Care: unable to participate  Note:   Patient continues to be confused; oriented to self only. Patient sat in srinath-chair in the dayroom area some this shift. Patient much more calm and redirectable. Patient unable to participate in the majority of shift assessment questions due to cognitive status. Sitter remains at patient's beside for fall safety.

## 2020-08-11 NOTE — PROGRESS NOTES
"INPATIENT PSYCHIATRIC PROGRESS NOTE    Name:  Vandana Velez  :  1935  MRN:  [id number]  Visit Number:  [id number]  Length of stay:  4    SUBJECTIVE  CC/Focus of Exam: psychosis    INTERVAL HISTORY:  The patient is confused and disorganized with rambling speech. She is reported to be less agitated and more cooperative. Appetite was reported to be better yesterday but didn't eat her breakfast today. She tends to confabulate, when asked where she was, she replied, \"this is not [place]\".     Review of Systems  The patient is vague in her answers and states everything is wrong with her.   OBJECTIVE    Temp:  [97.5 °F (36.4 °C)-98.6 °F (37 °C)] 97.5 °F (36.4 °C)  Heart Rate:  [68-89] 81  Resp:  [18] 18  BP: (118-144)/(54-70) 127/62    MENTAL STATUS EXAM:  Appearance:Casually dressed, good hygeine.   Psychomotor: Psychomotor slowing at times  Thought Content- delusional material present  Thought process-disorganized.  Suicidality: No SI  Homicidality: No HI  Perception: No AH/VH  Insight-poor  Judgement-poor    Lab Results (last 24 hours)     ** No results found for the last 24 hours. **             Imaging Results (Last 24 Hours)     ** No results found for the last 24 hours. **             ECG/EMG Results (most recent)     Procedure Component Value Units Date/Time    ECG 12 Lead [[id number]] Collected:  20 1034     Updated:  20 1555    Narrative:       Test Reason : to obtain baseline  Blood Pressure : **/** mmHG  Vent. Rate : 073 BPM     Atrial Rate : 073 BPM     P-R Int : 158 ms          QRS Dur : 096 ms      QT Int : 420 ms       P-R-T Axes : 063 000 041 degrees     QTc Int : 462 ms    Normal sinus rhythm  Normal ECG  No previous ECGs available  Confirmed by Neo Ha ([id number]) on 2020 3:55:19 PM    Referred By:  CRISTOFER           Confirmed By:Neo Ha           ALLERGIES: Patient has no known allergies.      Current Facility-Administered Medications:   •  acetaminophen (TYLENOL) " tablet 650 mg, 650 mg, Oral, Q6H PRN, Jesus Joe MD  •  aluminum-magnesium hydroxide-simethicone (MAALOX MAX) 400-400-40 MG/5ML suspension 15 mL, 15 mL, Oral, Q6H PRN, Jesus Joe MD  •  amLODIPine (NORVASC) tablet 5 mg, 5 mg, Oral, Daily, Pawan Talamantes MD, 5 mg at 08/11/20 0816  •  aspirin chewable tablet 81 mg, 81 mg, Oral, Daily, Pawan Talamantes MD, 81 mg at 08/11/20 0816  •  benzonatate (TESSALON) capsule 100 mg, 100 mg, Oral, TID PRN, Jesus Joe MD  •  carvedilol (COREG) tablet 25 mg, 25 mg, Oral, BID With Meals, Pawan Talamantes MD, 25 mg at 08/11/20 0816  •  cephalexin (KEFLEX) capsule 500 mg, 500 mg, Oral, Q12H, Pawan Talamantes MD, 500 mg at 08/11/20 0816  •  fluconazole (DIFLUCAN) tablet 100 mg, 100 mg, Oral, Daily, Pawan Talamantes MD, 100 mg at 08/11/20 0816  •  ibuprofen (ADVIL,MOTRIN) tablet 400 mg, 400 mg, Oral, Q6H PRN, Jesus Joe MD  •  loperamide (IMODIUM) capsule 2 mg, 2 mg, Oral, Q2H PRN, Jesus Joe MD  •  losartan (COZAAR) tablet 100 mg, 100 mg, Oral, Daily, Pawan Talamantes MD, 100 mg at 08/11/20 0816  •  magnesium hydroxide (MILK OF MAGNESIA) suspension 2400 mg/10mL 10 mL, 10 mL, Oral, Daily PRN, Jesus Joe MD  •  magnesium oxide (MAG-OX) tablet 400 mg, 400 mg, Oral, BID, Pawan Talamantes MD, 400 mg at 08/11/20 0815  •  melatonin tablet 3 mg, 3 mg, Oral, Nightly PRN, Jesus Joe MD  •  multivitamin (DAILY ROSETTE) tablet 1 tablet, 1 tablet, Oral, Daily, Pawan Talamantes MD, 1 tablet at 08/11/20 0816  •  OLANZapine zydis (zyPREXA) disintegrating tablet 5 mg, 5 mg, Oral, BID, Armando Barragan MD, 5 mg at 08/11/20 0815  •  ondansetron (ZOFRAN) tablet 4 mg, 4 mg, Oral, Q6H PRN, Jesus Joe MD  •  ondansetron (ZOFRAN) tablet 4 mg, 4 mg, Oral, QAM, Pawan Talamantes MD, 4 mg at 08/11/20 0815  •  pantoprazole (PROTONIX) EC tablet 40 mg, 40 mg, Oral, Vinita RODRIGUEZ Mazhar, MD, 40 mg at 08/11/20 0815  •  polyethylene glycol (MIRALAX) packet 17 g, 17 g, Oral, Daily,  Pawan Talamantes MD, 17 g at 08/11/20 0817  •  potassium chloride (K-DUR,KLOR-CON) CR tablet 20 mEq, 20 mEq, Oral, Daily, Pawan Talamantes MD, 20 mEq at 08/11/20 0816  •  risperiDONE (risperDAL) tablet 1 mg, 1 mg, Oral, Once, Armando Barragan MD  •  sodium chloride nasal spray 2 spray, 2 spray, Each Nare, PRN, Jesus Joe MD    ASSESSMENT & PLAN:      Psychosis (CMS/HCC)  - The patient appears to have unmasking of dementia secondary to delirium. She has positive findings of cortical volume loss and and subcortical small vessel disease on CT head. Will try to manage behavior disturbance with SGA and supportive treatment with behavioral interventions.  - Will need to inform family about probability of dementia, guardianship and disposition planning.   - Continue to treat symptomatically at this time.       UTI  - Continue Keflex and Diflucan  - Could be contributing to confusion       HTN  - Continue Cozaar, Coreg and Norvasc        Hypokalemia  - Continue KCl CR 20 mEq     Special precautions: Special Precautions Level 1 (1:1 sitter).    Behavioral Health Treatment Plan and Problem List: I have reviewed and approved the Behavioral Health Treatment Plan and Problem list.  The patient has had a chance to review and agrees with the treatment plan.     Clinician:  Pawan Talamantes MD  08/11/20  11:49

## 2020-08-12 PROCEDURE — 63710000001 ONDANSETRON PER 8 MG: Performed by: PSYCHIATRY & NEUROLOGY

## 2020-08-12 PROCEDURE — 99232 SBSQ HOSP IP/OBS MODERATE 35: CPT | Performed by: PSYCHIATRY & NEUROLOGY

## 2020-08-12 RX ADMIN — CARVEDILOL 25 MG: 25 TABLET, FILM COATED ORAL at 08:59

## 2020-08-12 RX ADMIN — MAGNESIUM GLUCONATE 500 MG ORAL TABLET 400 MG: 500 TABLET ORAL at 20:20

## 2020-08-12 RX ADMIN — Medication 1 TABLET: at 09:01

## 2020-08-12 RX ADMIN — AMLODIPINE BESYLATE 5 MG: 5 TABLET ORAL at 09:00

## 2020-08-12 RX ADMIN — POLYETHYLENE GLYCOL (3350) 17 G: 17 POWDER, FOR SOLUTION ORAL at 09:01

## 2020-08-12 RX ADMIN — ASPIRIN 81 MG: 81 TABLET, CHEWABLE ORAL at 09:00

## 2020-08-12 RX ADMIN — LOSARTAN POTASSIUM 100 MG: 50 TABLET, FILM COATED ORAL at 09:01

## 2020-08-12 RX ADMIN — OLANZAPINE 5 MG: 5 TABLET, ORALLY DISINTEGRATING ORAL at 09:00

## 2020-08-12 RX ADMIN — Medication 3 MG: at 20:21

## 2020-08-12 RX ADMIN — CEPHALEXIN 500 MG: 500 CAPSULE ORAL at 20:20

## 2020-08-12 RX ADMIN — POTASSIUM CHLORIDE 20 MEQ: 1500 TABLET, EXTENDED RELEASE ORAL at 09:00

## 2020-08-12 RX ADMIN — CEPHALEXIN 500 MG: 500 CAPSULE ORAL at 08:59

## 2020-08-12 RX ADMIN — ONDANSETRON HYDROCHLORIDE 4 MG: 4 TABLET, FILM COATED ORAL at 06:05

## 2020-08-12 RX ADMIN — MAGNESIUM GLUCONATE 500 MG ORAL TABLET 400 MG: 500 TABLET ORAL at 09:00

## 2020-08-12 RX ADMIN — OLANZAPINE 5 MG: 5 TABLET, ORALLY DISINTEGRATING ORAL at 20:20

## 2020-08-12 RX ADMIN — CARVEDILOL 25 MG: 25 TABLET, FILM COATED ORAL at 18:32

## 2020-08-12 RX ADMIN — PANTOPRAZOLE SODIUM 40 MG: 40 TABLET, DELAYED RELEASE ORAL at 06:05

## 2020-08-12 NOTE — NURSING NOTE
Pt requested to make a call .  Staff dialed the number that she spoke, and she talked with her son Oswald.

## 2020-08-12 NOTE — PLAN OF CARE
"  Problem: Overarching Goals (Adult)  Goal: Optimized Coping Skills in Response to Life Stressors  Intervention: Promote Effective Coping Strategies  Flowsheets (Taken 8/12/2020 3285)  Supportive Measures: active listening utilized; verbalization of feelings encouraged; self-care encouraged; problem solving facilitated  Note:   Patient states that she enjoys singing and praying for healthy coping skills       Therapist staffed case with Nursing this date. Patient has taken her medications this date. Did not eat breakfast but did eat lunch. Has been sleeping some this afternoon but was awake most of the morning. Nursing reports that the patient called her son this morning and talked with him. Patient was fixated on dying.  Son wants patient to experience some improvements before she returns home. She will plan to return home with home health and hired caregivers.      Data:  Therapist met with patient who at times was able to communicate effectively but other times would ramble. At first she said \"don;t come in here\" and was agitated. I introduced myself and patient calmed down after emotional support. Patient told me \"you're not a man; Just my therapist\". She also believes that her son is here in the hospital and trying to get her out. Reassured the patient that she would be in the hospital a short time and would return home with family. But encouraged her to be patient and allow time for her to get better.    Assessment: Patient was able to communicate effectively at times during our conversation and other times experienced confusion. Has rambling speech at times. Is anxious to discharge home.     Plan: Patient will continue with hospitalization. Will plan to return home with family at discharge.    "

## 2020-08-12 NOTE — PLAN OF CARE
"  Problem: Patient Care Overview  Goal: Plan of Care Review  Outcome: Ongoing (interventions implemented as appropriate)  Flowsheets (Taken 8/12/2020 5398)  Progress: no change  Patient Agreement with Plan of Care: unable to participate  Note:   Patient continues to be confused and is oriented to self only. Unable to participate in assessment questions. Patient became preoccupied with her bowels this shift; patient had a bowel movement on 8/10/20. Patient attempting to stick her fingers in her anus to \"dig out\" stool. Patient was offered milk of magnesia for constipation, however, patient refused. Patient was difficult to redirect. Patient started yelling and hitting at staff. PRN melatonin was given to help with sleep.      "

## 2020-08-12 NOTE — PLAN OF CARE
Problem: Patient Care Overview  Goal: Plan of Care Review  Outcome: Ongoing (interventions implemented as appropriate)  Flowsheets (Taken 8/12/2020 3160)  Progress: improving  Plan of Care Reviewed With: patient  Patient Agreement with Plan of Care: unable to participate  Note:   Pt in a mostly pleasant mood today.  Ate some lunch and supper.  Took all her meds.  Sat out in the dayroom this afternoon and evening.

## 2020-08-13 PROCEDURE — 99232 SBSQ HOSP IP/OBS MODERATE 35: CPT | Performed by: PSYCHIATRY & NEUROLOGY

## 2020-08-13 PROCEDURE — 63710000001 ONDANSETRON PER 8 MG: Performed by: PSYCHIATRY & NEUROLOGY

## 2020-08-13 RX ORDER — DONEPEZIL HYDROCHLORIDE 5 MG/1
5 TABLET, FILM COATED ORAL NIGHTLY
Status: DISCONTINUED | OUTPATIENT
Start: 2020-08-13 | End: 2020-08-14 | Stop reason: HOSPADM

## 2020-08-13 RX ORDER — OLANZAPINE 5 MG/1
5 TABLET ORAL NIGHTLY
Status: DISCONTINUED | OUTPATIENT
Start: 2020-08-13 | End: 2020-08-14 | Stop reason: HOSPADM

## 2020-08-13 RX ADMIN — ONDANSETRON HYDROCHLORIDE 4 MG: 4 TABLET, FILM COATED ORAL at 06:25

## 2020-08-13 RX ADMIN — ASPIRIN 81 MG: 81 TABLET, CHEWABLE ORAL at 09:25

## 2020-08-13 RX ADMIN — CEPHALEXIN 500 MG: 500 CAPSULE ORAL at 20:50

## 2020-08-13 RX ADMIN — Medication 1 TABLET: at 09:25

## 2020-08-13 RX ADMIN — MAGNESIUM GLUCONATE 500 MG ORAL TABLET 400 MG: 500 TABLET ORAL at 09:25

## 2020-08-13 RX ADMIN — PANTOPRAZOLE SODIUM 40 MG: 40 TABLET, DELAYED RELEASE ORAL at 06:25

## 2020-08-13 RX ADMIN — Medication 3 MG: at 20:50

## 2020-08-13 RX ADMIN — MAGNESIUM GLUCONATE 500 MG ORAL TABLET 400 MG: 500 TABLET ORAL at 20:50

## 2020-08-13 RX ADMIN — POLYETHYLENE GLYCOL (3350) 17 G: 17 POWDER, FOR SOLUTION ORAL at 09:26

## 2020-08-13 RX ADMIN — POTASSIUM CHLORIDE 20 MEQ: 1500 TABLET, EXTENDED RELEASE ORAL at 09:26

## 2020-08-13 RX ADMIN — OLANZAPINE 5 MG: 5 TABLET, ORALLY DISINTEGRATING ORAL at 09:25

## 2020-08-13 RX ADMIN — DONEPEZIL HYDROCHLORIDE 5 MG: 5 TABLET, FILM COATED ORAL at 20:50

## 2020-08-13 RX ADMIN — CEPHALEXIN 500 MG: 500 CAPSULE ORAL at 09:26

## 2020-08-13 RX ADMIN — OLANZAPINE 5 MG: 5 TABLET, FILM COATED ORAL at 20:50

## 2020-08-13 NOTE — PLAN OF CARE
Problem: Patient Care Overview  Goal: Interprofessional Rounds/Family Conf  Flowsheets (Taken 8/13/2020 1129)  Participants: family; psychiatrist  Summary: Therapist staffed case with Dr. Talamantes and patient's son  Note:   Therapist staffed case with Dr. Talamantes today.  He stated that most likely the patient is in the beginning stages of dementia.  He plans on starting Aricept today.  Patient should be able to discharge in the next few days.  He recommends that patient have a guardian.  Informed him that patient's son are currently her POA.  Patient will plan to return home with her family at discharge.     Problem: Overarching Goals (Adult)  Goal: Optimized Coping Skills in Response to Life Stressors  Intervention: Promote Effective Coping Strategies  Flowsheets (Taken 8/13/2020 1129)  Supportive Measures: active listening utilized; counseling provided; decision-making supported; verbalization of feelings encouraged; self-care encouraged; relaxation techniques promoted  Note:   Patient enjoys praying and singing    Data: Therapist staffed case with  on this date.  Patient should be able to discharge home in the next few days.  He recommended that I communicate with patient's family the need for patient to have a guardian/POA.  And also to anticipate discharge soon.    Therapist contacted the patient's son for update.  Informed him of doctors recommendation of patient have a guardian/POA.  He reports that both he and patient's other son are acting power of  at this time.  He states that he would request that the patient stay in the hospital as long as she is making progress.  They are willing to take her home anytime she is ready to discharge and has stabilized.  He did note that the patient's primary care physician is different. States that the patients PCP is Dr Ernesto Tanner at [facility] in [place]. States that she has an appointment scheduled for September 1 at 10:45 AM.   He requests that her medications be sent to [facility] in [place].    Assessment:  Son is supportive and hopeful that patient will continue to make progress upon returning home from the hospital.  He states that previously up until 1 month ago the patient was living and taking care of her own self independently.    Plan: Patient will continue hospitalization until stabilized on current medications.  We will plan to return home with home health services and a in-home caregiver.  Patient has an appointment scheduled with Dr. Ernesto Tanner.

## 2020-08-13 NOTE — PLAN OF CARE
Problem: Patient Care Overview  Goal: Plan of Care Review  Outcome: Ongoing (interventions implemented as appropriate)  Flowsheets (Taken 8/13/2020 1536)  Progress: improving  Plan of Care Reviewed With: patient  Patient Agreement with Plan of Care: unable to participate  Note:   Pt is alert, disoriented.  Has been up in day room most of afternoon.  Good  po intake noted.  Denies pain or discomfort.  Incontinent at times.  Pleasant mood and cooperative with staff.

## 2020-08-13 NOTE — DISCHARGE INSTR - APPOINTMENTS
Dr Ernesto Tanner  [address] [facility] [address]  [facility]  [phone]  Appt:September 1st @ 10:45

## 2020-08-13 NOTE — PLAN OF CARE
Problem: Patient Care Overview  Goal: Plan of Care Review  Outcome: Ongoing (interventions implemented as appropriate)  Flowsheets (Taken 8/13/2020 0418)  Progress: improving  Plan of Care Reviewed With: patient  Patient Agreement with Plan of Care: agrees  Note:   Individual calm and cooperative. Compliant with medications and treatment. No issues noted this shift.

## 2020-08-13 NOTE — PROGRESS NOTES
INPATIENT PSYCHIATRIC PROGRESS NOTE    Name:  Vandana Velez  :  1935  MRN:  [id number]  Visit Number:  [id number]  Length of stay:  6    SUBJECTIVE  CC/Focus of Exam: psychosis    INTERVAL HISTORY:  The patient is reported to be doing better in terms of her agitated behaviors. Is resting comfortably and is cooperative with the staff. She continues to be confused and disoriented with time and place but was able to correctly recall the year this is. She was sitting in her bed and had her meal tray in front of her and had no difficulty using a straw to drink her lemonade.    Review of Systems  Gen: No fever, chills  Resp: No soa  GI: No nvd  OBJECTIVE    Temp:  [97 °F (36.1 °C)-98.1 °F (36.7 °C)] 97 °F (36.1 °C)  Heart Rate:  [71-80] 71  Resp:  [18] 18  BP: (102-120)/(58-98) 102/58    MENTAL STATUS EXAM:  Appearance:Casually dressed, good hygeine.   Psychomotor: Psychomotor slowing at times  Thought Content- delusional material present  Thought process-disorganized.  Suicidality: No SI  Homicidality: No HI  Perception: No AH/VH  Insight-poor  Judgement-poor    Lab Results (last 24 hours)     ** No results found for the last 24 hours. **             Imaging Results (Last 24 Hours)     ** No results found for the last 24 hours. **             ECG/EMG Results (most recent)     Procedure Component Value Units Date/Time    ECG 12 Lead [[id number]] Collected:  20 1034     Updated:  20 1555    Narrative:       Test Reason : to obtain baseline  Blood Pressure : **/** mmHG  Vent. Rate : 073 BPM     Atrial Rate : 073 BPM     P-R Int : 158 ms          QRS Dur : 096 ms      QT Int : 420 ms       P-R-T Axes : 063 000 041 degrees     QTc Int : 462 ms    Normal sinus rhythm  Normal ECG  No previous ECGs available  Confirmed by Neo Ha ([id number]) on 2020 3:55:19 PM    Referred By:  CRISTOFER           Confirmed By:Neo Ha           ALLERGIES: Patient has no known allergies.      Current  Facility-Administered Medications:   •  acetaminophen (TYLENOL) tablet 650 mg, 650 mg, Oral, Q6H PRN, Jesus Joe MD  •  aluminum-magnesium hydroxide-simethicone (MAALOX MAX) 400-400-40 MG/5ML suspension 15 mL, 15 mL, Oral, Q6H PRN, Jesus Joe MD  •  amLODIPine (NORVASC) tablet 5 mg, 5 mg, Oral, Daily, Pawan Talamantes MD, 5 mg at 08/12/20 0900  •  aspirin chewable tablet 81 mg, 81 mg, Oral, Daily, Pawan Talamantes MD, 81 mg at 08/12/20 0900  •  benzonatate (TESSALON) capsule 100 mg, 100 mg, Oral, TID PRN, Jesus Joe MD  •  carvedilol (COREG) tablet 25 mg, 25 mg, Oral, BID With Meals, Pawan Talamantes MD, 25 mg at 08/12/20 1832  •  cephalexin (KEFLEX) capsule 500 mg, 500 mg, Oral, Q12H, Pawan Talamantes MD, 500 mg at 08/12/20 2020  •  ibuprofen (ADVIL,MOTRIN) tablet 400 mg, 400 mg, Oral, Q6H PRN, Jesus Joe MD  •  loperamide (IMODIUM) capsule 2 mg, 2 mg, Oral, Q2H PRN, Jesus Joe MD  •  losartan (COZAAR) tablet 100 mg, 100 mg, Oral, Daily, Pawan Talamantes MD, 100 mg at 08/12/20 0901  •  magnesium hydroxide (MILK OF MAGNESIA) suspension 2400 mg/10mL 10 mL, 10 mL, Oral, Daily PRN, Jesus Joe MD  •  magnesium oxide (MAG-OX) tablet 400 mg, 400 mg, Oral, BID, Pawan Talamantes MD, 400 mg at 08/12/20 2020  •  melatonin tablet 3 mg, 3 mg, Oral, Nightly PRN, Jesus Joe MD, 3 mg at 08/12/20 2021  •  multivitamin (DAILY ROSETTE) tablet 1 tablet, 1 tablet, Oral, Daily, Pawan Talamantes MD, 1 tablet at 08/12/20 0901  •  OLANZapine zydis (zyPREXA) disintegrating tablet 5 mg, 5 mg, Oral, BID, Armando Barragan MD, 5 mg at 08/12/20 2020  •  ondansetron (ZOFRAN) tablet 4 mg, 4 mg, Oral, Q6H PRN, Jesus Joe MD  •  ondansetron (ZOFRAN) tablet 4 mg, 4 mg, Oral, Vinita RODRIGUEZ Mazhar, MD, 4 mg at 08/13/20 0625  •  pantoprazole (PROTONIX) EC tablet 40 mg, 40 mg, Oral, Vinita RODRIGUEZ Mazhar, MD, 40 mg at 08/13/20 0625  •  polyethylene glycol (MIRALAX) packet 17 g, 17 g, Oral, Daily, Pawan Talamantes MD, 17 g  at 08/12/20 0901  •  potassium chloride (K-DUR,KLOR-CON) CR tablet 20 mEq, 20 mEq, Oral, Daily, Pawan Talamantes MD, 20 mEq at 08/12/20 0900  •  risperiDONE (risperDAL) tablet 1 mg, 1 mg, Oral, Once, Armando Barragan MD  •  sodium chloride nasal spray 2 spray, 2 spray, Each Nare, PRN, Jesus Joe MD    ASSESSMENT & PLAN:      Psychosis (CMS/HCC)  - The patient appears to have unmasking of dementia secondary to delirium. She has positive findings of cortical volume loss and and subcortical small vessel disease on CT head. Will try to manage behavior disturbance with SGA and supportive treatment with behavioral interventions.  - Will need to inform family about probability of dementia, guardianship and disposition planning.   - Continue to treat symptomatically at this time. Patient appears to be tolerating Zyprexa Zydis and there are no adverse effects.       UTI  - Continue Keflex   - Could be contributing to confusion       HTN  - Continue Cozaar, Coreg and Norvasc        Hypokalemia  - Continue KCl CR 20 mEq     Special precautions: Special Precautions Level 1 (1:1 sitter).    Behavioral Health Treatment Plan and Problem List: I have reviewed and approved the Behavioral Health Treatment Plan and Problem list.  The patient has had a chance to review and agrees with the treatment plan.     Clinician:  Pawan Talamantes MD  08/13/20  08:55

## 2020-08-13 NOTE — PROGRESS NOTES
INPATIENT PSYCHIATRIC PROGRESS NOTE    Name:  Vandana Velez  :  1935  MRN:  [id number]  Visit Number:  [id number]  Length of stay:  6    SUBJECTIVE  CC/Focus of Exam: psychosis    INTERVAL HISTORY:  The patient seen in her room, sitter was present. She was able to walk to the bathroom this am and is now sitting up in the bed and having her breakfast without any assistance. She knows she is in an [facility] and then added it is the one closest to home, and for date she leaned over and looked at a computer screen and reported it to be Aug 13, but couldn't tell the year.    Review of Systems  Gen: No fever, chills  Resp: No soa  GI: No nvd  OBJECTIVE    Temp:  [97 °F (36.1 °C)-98.1 °F (36.7 °C)] 97 °F (36.1 °C)  Heart Rate:  [71-80] 71  Resp:  [18] 18  BP: (102-120)/(58-98) 102/58    MENTAL STATUS EXAM:  Appearance:Casually dressed, good hygeine.   Psychomotor: Psychomotor slowing at times  Thought Content- delusional material present  Thought process-disorganized.  Suicidality: No SI  Homicidality: No HI  Perception: No AH/VH  Insight-poor  Judgement-poor    Lab Results (last 24 hours)     ** No results found for the last 24 hours. **             Imaging Results (Last 24 Hours)     ** No results found for the last 24 hours. **             ECG/EMG Results (most recent)     Procedure Component Value Units Date/Time    ECG 12 Lead [[id number]] Collected:  20 1034     Updated:  20 1555    Narrative:       Test Reason : to obtain baseline  Blood Pressure : **/** mmHG  Vent. Rate : 073 BPM     Atrial Rate : 073 BPM     P-R Int : 158 ms          QRS Dur : 096 ms      QT Int : 420 ms       P-R-T Axes : 063 000 041 degrees     QTc Int : 462 ms    Normal sinus rhythm  Normal ECG  No previous ECGs available  Confirmed by Neo Ha ([id number]) on 2020 3:55:19 PM    Referred By:  CRISTOFER           Confirmed By:Neo aH           ALLERGIES: Patient has no known allergies.      Current  Facility-Administered Medications:   •  acetaminophen (TYLENOL) tablet 650 mg, 650 mg, Oral, Q6H PRN, Jesus Joe MD  •  aluminum-magnesium hydroxide-simethicone (MAALOX MAX) 400-400-40 MG/5ML suspension 15 mL, 15 mL, Oral, Q6H PRN, Jesus Joe MD  •  amLODIPine (NORVASC) tablet 5 mg, 5 mg, Oral, Daily, Pawan Talamantes MD, 5 mg at 08/12/20 0900  •  aspirin chewable tablet 81 mg, 81 mg, Oral, Daily, Pawan Talamantes MD, 81 mg at 08/12/20 0900  •  benzonatate (TESSALON) capsule 100 mg, 100 mg, Oral, TID PRN, Jesus Joe MD  •  carvedilol (COREG) tablet 25 mg, 25 mg, Oral, BID With Meals, Pawan Talamantes MD, 25 mg at 08/12/20 1832  •  cephalexin (KEFLEX) capsule 500 mg, 500 mg, Oral, Q12H, Pawan Talamantes MD, 500 mg at 08/12/20 2020  •  ibuprofen (ADVIL,MOTRIN) tablet 400 mg, 400 mg, Oral, Q6H PRN, Jesus Joe MD  •  loperamide (IMODIUM) capsule 2 mg, 2 mg, Oral, Q2H PRN, Jesus Joe MD  •  losartan (COZAAR) tablet 100 mg, 100 mg, Oral, Daily, Pawan Talamantes MD, 100 mg at 08/12/20 0901  •  magnesium hydroxide (MILK OF MAGNESIA) suspension 2400 mg/10mL 10 mL, 10 mL, Oral, Daily PRN, Jesus Joe MD  •  magnesium oxide (MAG-OX) tablet 400 mg, 400 mg, Oral, BID, Pawan Talamantes MD, 400 mg at 08/12/20 2020  •  melatonin tablet 3 mg, 3 mg, Oral, Nightly PRN, Jesus Joe MD, 3 mg at 08/12/20 2021  •  multivitamin (DAILY ROSETTE) tablet 1 tablet, 1 tablet, Oral, Daily, Pawan Talamantes MD, 1 tablet at 08/12/20 0901  •  OLANZapine zydis (zyPREXA) disintegrating tablet 5 mg, 5 mg, Oral, BID, Armando Barragan MD, 5 mg at 08/12/20 2020  •  ondansetron (ZOFRAN) tablet 4 mg, 4 mg, Oral, Q6H PRN, Jesus Joe MD  •  ondansetron (ZOFRAN) tablet 4 mg, 4 mg, Oral, Vinita RODRIGUEZ Mazhar, MD, 4 mg at 08/13/20 0625  •  pantoprazole (PROTONIX) EC tablet 40 mg, 40 mg, Oral, Vinita RODRIGUEZ Mazhar, MD, 40 mg at 08/13/20 0625  •  polyethylene glycol (MIRALAX) packet 17 g, 17 g, Oral, Daily, Pawan Talamantes MD, 17 g  at 08/12/20 0901  •  potassium chloride (K-DUR,KLOR-CON) CR tablet 20 mEq, 20 mEq, Oral, Daily, Pawan Talamantes MD, 20 mEq at 08/12/20 0900  •  sodium chloride nasal spray 2 spray, 2 spray, Each Nare, PRN, Jesus Joe MD    ASSESSMENT & PLAN:      Psychosis (CMS/HCC)  - The patient appears to have unmasking of dementia secondary to delirium. She has positive findings of cortical volume loss and and subcortical small vessel disease on CT head. Will try to manage behavior disturbance with SGA and supportive treatment with behavioral interventions.  - Will need to inform family about probability of dementia, guardianship and disposition planning.   - Continue to treat symptomatically at this time. Patient appears to be tolerating Zyprexa Zydis and there are no adverse effects. Will change it to regular Zyprexa 5 mg at bedtime.  - Check lipids in am  - Start Aricept       UTI  - Continue Keflex   - Could be contributing to confusion       HTN  - Continue Cozaar, Coreg and Norvasc        Hypokalemia  - Continue KCl CR 20 mEq     Special precautions: Special Precautions Level 1 (1:1 sitter).    Behavioral Health Treatment Plan and Problem List: I have reviewed and approved the Behavioral Health Treatment Plan and Problem list.  The patient has had a chance to review and agrees with the treatment plan.     Clinician:  Pawan Talamantes MD  08/13/20  09:06

## 2020-08-14 VITALS
DIASTOLIC BLOOD PRESSURE: 67 MMHG | HEIGHT: 62 IN | TEMPERATURE: 96.7 F | SYSTOLIC BLOOD PRESSURE: 114 MMHG | RESPIRATION RATE: 20 BRPM | HEART RATE: 78 BPM | OXYGEN SATURATION: 96 %

## 2020-08-14 LAB
CHOLEST SERPL-MCNC: 167 MG/DL (ref 0–200)
HDLC SERPL-MCNC: 57 MG/DL (ref 40–60)
LDLC SERPL CALC-MCNC: 90 MG/DL (ref 0–100)
LDLC/HDLC SERPL: 1.58 {RATIO}
TRIGL SERPL-MCNC: 99 MG/DL (ref 0–150)
VLDLC SERPL-MCNC: 19.8 MG/DL

## 2020-08-14 PROCEDURE — 99239 HOSP IP/OBS DSCHRG MGMT >30: CPT | Performed by: PSYCHIATRY & NEUROLOGY

## 2020-08-14 PROCEDURE — 63710000001 ONDANSETRON PER 8 MG: Performed by: PSYCHIATRY & NEUROLOGY

## 2020-08-14 PROCEDURE — 80061 LIPID PANEL: CPT | Performed by: PSYCHIATRY & NEUROLOGY

## 2020-08-14 RX ORDER — DONEPEZIL HYDROCHLORIDE 5 MG/1
5 TABLET, FILM COATED ORAL NIGHTLY
Qty: 30 TABLET | Refills: 0 | Status: SHIPPED | OUTPATIENT
Start: 2020-08-14

## 2020-08-14 RX ORDER — CEPHALEXIN 500 MG/1
500 CAPSULE ORAL EVERY 12 HOURS SCHEDULED
Qty: 3 CAPSULE | Refills: 0 | Status: SHIPPED | OUTPATIENT
Start: 2020-08-14 | End: 2020-08-16

## 2020-08-14 RX ORDER — OLANZAPINE 5 MG/1
5 TABLET ORAL NIGHTLY
Qty: 30 TABLET | Refills: 0 | Status: SHIPPED | OUTPATIENT
Start: 2020-08-14

## 2020-08-14 RX ADMIN — AMLODIPINE BESYLATE 5 MG: 5 TABLET ORAL at 08:04

## 2020-08-14 RX ADMIN — Medication 1 TABLET: at 08:04

## 2020-08-14 RX ADMIN — CEPHALEXIN 500 MG: 500 CAPSULE ORAL at 08:04

## 2020-08-14 RX ADMIN — CARVEDILOL 25 MG: 25 TABLET, FILM COATED ORAL at 07:36

## 2020-08-14 RX ADMIN — ONDANSETRON HYDROCHLORIDE 4 MG: 4 TABLET, FILM COATED ORAL at 07:37

## 2020-08-14 RX ADMIN — LOSARTAN POTASSIUM 100 MG: 50 TABLET, FILM COATED ORAL at 08:04

## 2020-08-14 RX ADMIN — ACETAMINOPHEN 650 MG: 325 TABLET ORAL at 03:24

## 2020-08-14 RX ADMIN — MAGNESIUM GLUCONATE 500 MG ORAL TABLET 400 MG: 500 TABLET ORAL at 08:04

## 2020-08-14 RX ADMIN — PANTOPRAZOLE SODIUM 40 MG: 40 TABLET, DELAYED RELEASE ORAL at 07:36

## 2020-08-14 RX ADMIN — POTASSIUM CHLORIDE 20 MEQ: 1500 TABLET, EXTENDED RELEASE ORAL at 08:04

## 2020-08-14 RX ADMIN — ASPIRIN 81 MG: 81 TABLET, CHEWABLE ORAL at 08:04

## 2020-08-14 NOTE — PLAN OF CARE
Problem: Patient Care Overview  Goal: Interprofessional Rounds/Family Conf  Flowsheets (Taken 8/14/2020 1110)  Participants: psychiatrist  Summary: Therapist staffed case with Dr Talamantes this date.  Note:   Patient will discharge home this date. She will return to her home with in home care. Her son will transport her home. She will follow up with her PCP Dr Ernesto Tanner and her medications will be sent to [facility] in [place]. Nursing staff will contact family when she has paperwork completed for discharge.

## 2020-08-14 NOTE — DISCHARGE SUMMARY
:  1935  MRN:  [id number]  Visit Number:  [id number]      Date of Admission:2020   Date of Discharge:  2020    Discharge Diagnosis:  Active Problems:    Psychosis (CMS/[facility])        Admission Diagnosis:  Psychosis (CMS/[facility]) [F29]     LEN Velez is a 85 y.o. female who was admitted on 2020 with complaints of bizarre behaviors. The patient was seen in her room where she is very irritable and unhappy and has been screaming on an ongoing basis. She is confused and is not able to provide a coherent history and tends to confabulate also, she believes she is in [place] and tried to shana the question about time saying it is present time, and she wants to see her own doctor because he knows everything about her and she believes he is around as he also works in [facility].   According to report provided by patient's son, up until July 10, 2020, the patient was completely independent and coherent in ADLs and IADLs. She was able to drive on her own, go to grocery store and also lived by herself. Son reported to nursing staff earlier today that the patient underwent botox injection for her bladder on 7/10 and she has had these injections on and off for the last seven years, but since her last injection she has not done well. Her condition has declined and she has had spells of weakness with confusion and incoherence and could no longer taker care of herself. She was admitted twice to the hospital for low sodium since 7/10 and was discharged last time to NH for rehab. She became agitated at the NH and was admitted to the psych unit and came back with no improvement. She was treated with Seroquel but it didn't help.       Hospital Course  Patient is a 85 y.o. female presented with altered mental status, agitated and bizarre behaviors.  Patient was admitted to the treatment center [facility] for safety, further evaluation and treatment.  The patient was very agitated when she came to  "the hospital and needed 1 on 1 monitoring for safety.  She would not cooperate with treatment team and was confused and disoriented.  This is new behavior for her and patient was reportedly independent in her activities of daily living up until last month.  It appeared patient developed delirium secondary to UTI which and last underlying dementia which contributed to her psychotic behaviors.  Patient needed multiple as needed medications to help her calm down.  She was started on Zyprexa Zydis 5 mg and the dose was increased to 5 mg twice a day.  Patient gradually calmed down and started cooperating with the staff.  She was continued on her antibiotics and it is possible that an improvement in her UTI also helped.  Her Zyprexa was then changed to regular Zyprexa 5 mg at bedtime and Aricept 5 mg was added to help improve her cognition.  Patient continued to be confused but she was calm and more cooperative with a pleasant demeanor most of the times.  It was felt the patient achieved maximum benefit from this hospitalization and she could be discharged to the care of her family.  The patient's family was informed of possible underlying diagnosis of dementia and her son already had power of  over the patient.    Mental Status Exam upon discharge:   Mood \" good\"   Affect-congruent, appropriate, stable  Thought Content-goal directed, no delusional material present  Thought process-linear, organized.  Suicidality: No SI  Homicidality: No HI  Perception: No AH/    Procedures Performed         Consults:   Consults     No orders found from 7/9/2020 to 8/8/2020.          Pertinent Test Results: WBC 6.5, Glucose 99, electrolytes faustina, BUN high at 39, Creatinine normal at 1.0, Sodium normal at 140, Potassium low at 3.4, GFR low at 52.9, Blood alcohol level 0.0, UDS negative.COV2 negative. CT of head shows cortical volume loss and scattered periventricular/subcortical small vessel ischemic disease. Intravascular " calcifications are present.  EKG reviewed. LVH, Normal sinus rhythm, QTc 416    Condition on Discharge:  improved    Vital Signs  Temp:  [97.3 °F (36.3 °C)-97.6 °F (36.4 °C)] 97.6 °F (36.4 °C)  Heart Rate:  [77-85] 85  Resp:  [18-20] 20  BP: (112-144)/(51-85) 139/67      Discharge Disposition:  Home or Self Care    Discharge Medications:     Discharge Medications      New Medications      Instructions Start Date   donepezil 5 MG tablet  Commonly known as:  ARICEPT   5 mg, Oral, Nightly      OLANZapine 5 MG tablet  Commonly known as:  zyPREXA   5 mg, Oral, Nightly         Changes to Medications      Instructions Start Date   cephalexin 500 MG capsule  Commonly known as:  KEFLEX  What changed:  when to take this   500 mg, Oral, Every 12 Hours Scheduled         Continue These Medications      Instructions Start Date   amLODIPine 5 MG tablet  Commonly known as:  NORVASC   5 mg, Oral, Daily      aspirin 81 MG chewable tablet   81 mg, Oral, Daily      carvedilol 25 MG tablet  Commonly known as:  COREG   25 mg, Oral, 2 Times Daily With Meals      esomeprazole 40 MG capsule  Commonly known as:  nexIUM   40 mg, Oral, Every Morning Before Breakfast      losartan 100 MG tablet  Commonly known as:  COZAAR   100 mg, Oral, Daily      magnesium oxide 400 (241.3 Mg) MG tablet tablet  Commonly known as:  MAGOX   400 mg, Oral, 2 times daily      multivitamin tablet tablet   1 tablet, Oral, Daily      ondansetron 4 MG tablet  Commonly known as:  ZOFRAN   4 mg, Oral, Every Morning      polyethylene glycol packet  Commonly known as:  MIRALAX   17 g, Oral, Daily      potassium chloride 20 MEQ CR tablet  Commonly known as:  K-DUR,KLOR-CON   20 mEq, Oral, Daily         Stop These Medications    fluconazole 50 MG tablet  Commonly known as:  DIFLUCAN            Discharge Diet: Regular    Activity at Discharge: As tolerated    Follow-up Appointments        Dr Ernesto Tanner  [id number]  [phone]  Appt:September  1st @ 10:45         Test Results Pending at Discharge      Time spent in discharge: 35 minutes    Clinician:   Pawan Talamantes MD  08/14/20  10:37

## 2020-08-14 NOTE — PLAN OF CARE
Problem: Patient Care Overview  Goal: Plan of Care Review  Outcome: Ongoing (interventions implemented as appropriate)  Flowsheets (Taken 8/14/2020 6102)  Progress: no change  Plan of Care Reviewed With: patient  Patient Agreement with Plan of Care: agrees  Note:   Individual continues to be agitated and confused. Sitter at bedside for falls. Pt was given a cup of water and she threw it at her sitter. Has not slept well through the shift.